# Patient Record
Sex: MALE | Race: BLACK OR AFRICAN AMERICAN
[De-identification: names, ages, dates, MRNs, and addresses within clinical notes are randomized per-mention and may not be internally consistent; named-entity substitution may affect disease eponyms.]

---

## 2017-02-20 ENCOUNTER — HOSPITAL ENCOUNTER (INPATIENT)
Dept: HOSPITAL 17 - NEPE | Age: 67
LOS: 6 days | Discharge: HOME | DRG: 871 | End: 2017-02-26
Attending: HOSPITALIST | Admitting: HOSPITALIST
Payer: COMMERCIAL

## 2017-02-20 VITALS
SYSTOLIC BLOOD PRESSURE: 209 MMHG | OXYGEN SATURATION: 95 % | HEART RATE: 92 BPM | RESPIRATION RATE: 20 BRPM | DIASTOLIC BLOOD PRESSURE: 106 MMHG | TEMPERATURE: 98.5 F

## 2017-02-20 DIAGNOSIS — K04.7: ICD-10-CM

## 2017-02-20 DIAGNOSIS — K76.0: ICD-10-CM

## 2017-02-20 DIAGNOSIS — R33.8: ICD-10-CM

## 2017-02-20 DIAGNOSIS — Z95.5: ICD-10-CM

## 2017-02-20 DIAGNOSIS — N40.1: ICD-10-CM

## 2017-02-20 DIAGNOSIS — I10: ICD-10-CM

## 2017-02-20 DIAGNOSIS — R10.84: ICD-10-CM

## 2017-02-20 DIAGNOSIS — K21.9: ICD-10-CM

## 2017-02-20 DIAGNOSIS — J18.9: ICD-10-CM

## 2017-02-20 DIAGNOSIS — N20.0: ICD-10-CM

## 2017-02-20 DIAGNOSIS — I25.10: ICD-10-CM

## 2017-02-20 DIAGNOSIS — N17.9: ICD-10-CM

## 2017-02-20 DIAGNOSIS — R74.8: ICD-10-CM

## 2017-02-20 DIAGNOSIS — Z79.4: ICD-10-CM

## 2017-02-20 DIAGNOSIS — E11.9: ICD-10-CM

## 2017-02-20 DIAGNOSIS — A41.59: Primary | ICD-10-CM

## 2017-02-20 DIAGNOSIS — R65.20: ICD-10-CM

## 2017-02-20 PROCEDURE — 74177 CT ABD & PELVIS W/CONTRAST: CPT

## 2017-02-20 PROCEDURE — 76705 ECHO EXAM OF ABDOMEN: CPT

## 2017-02-20 PROCEDURE — 83540 ASSAY OF IRON: CPT

## 2017-02-20 PROCEDURE — 83520 IMMUNOASSAY QUANT NOS NONAB: CPT

## 2017-02-20 PROCEDURE — 87205 SMEAR GRAM STAIN: CPT

## 2017-02-20 PROCEDURE — 96374 THER/PROPH/DIAG INJ IV PUSH: CPT

## 2017-02-20 PROCEDURE — 87804 INFLUENZA ASSAY W/OPTIC: CPT

## 2017-02-20 PROCEDURE — 83550 IRON BINDING TEST: CPT

## 2017-02-20 PROCEDURE — G0480 DRUG TEST DEF 1-7 CLASSES: HCPCS

## 2017-02-20 PROCEDURE — 82105 ALPHA-FETOPROTEIN SERUM: CPT

## 2017-02-20 PROCEDURE — 80053 COMPREHEN METABOLIC PANEL: CPT

## 2017-02-20 PROCEDURE — 83690 ASSAY OF LIPASE: CPT

## 2017-02-20 PROCEDURE — 96375 TX/PRO/DX INJ NEW DRUG ADDON: CPT

## 2017-02-20 PROCEDURE — 80076 HEPATIC FUNCTION PANEL: CPT

## 2017-02-20 PROCEDURE — 82948 REAGENT STRIP/BLOOD GLUCOSE: CPT

## 2017-02-20 PROCEDURE — 82103 ALPHA-1-ANTITRYPSIN TOTAL: CPT

## 2017-02-20 PROCEDURE — 87086 URINE CULTURE/COLONY COUNT: CPT

## 2017-02-20 PROCEDURE — 87040 BLOOD CULTURE FOR BACTERIA: CPT

## 2017-02-20 PROCEDURE — 80074 ACUTE HEPATITIS PANEL: CPT

## 2017-02-20 PROCEDURE — 85025 COMPLETE CBC W/AUTO DIFF WBC: CPT

## 2017-02-20 PROCEDURE — 86038 ANTINUCLEAR ANTIBODIES: CPT

## 2017-02-20 PROCEDURE — 87186 SC STD MICRODIL/AGAR DIL: CPT

## 2017-02-20 PROCEDURE — C9113 INJ PANTOPRAZOLE SODIUM, VIA: HCPCS

## 2017-02-20 PROCEDURE — A9569 TECHNETIUM TC-99M AUTO WBC: HCPCS

## 2017-02-20 PROCEDURE — 82248 BILIRUBIN DIRECT: CPT

## 2017-02-20 PROCEDURE — 80048 BASIC METABOLIC PNL TOTAL CA: CPT

## 2017-02-20 PROCEDURE — 83516 IMMUNOASSAY NONANTIBODY: CPT

## 2017-02-20 PROCEDURE — 81001 URINALYSIS AUTO W/SCOPE: CPT

## 2017-02-20 PROCEDURE — 78806: CPT

## 2017-02-20 PROCEDURE — 83880 ASSAY OF NATRIURETIC PEPTIDE: CPT

## 2017-02-20 PROCEDURE — 86256 FLUORESCENT ANTIBODY TITER: CPT

## 2017-02-20 PROCEDURE — 82728 ASSAY OF FERRITIN: CPT

## 2017-02-20 PROCEDURE — 80329 ANALGESICS NON-OPIOID 1 OR 2: CPT

## 2017-02-20 PROCEDURE — 82390 ASSAY OF CERULOPLASMIN: CPT

## 2017-02-20 PROCEDURE — 93005 ELECTROCARDIOGRAM TRACING: CPT

## 2017-02-20 PROCEDURE — 96361 HYDRATE IV INFUSION ADD-ON: CPT

## 2017-02-20 PROCEDURE — 71010: CPT

## 2017-02-20 PROCEDURE — 83605 ASSAY OF LACTIC ACID: CPT

## 2017-02-21 VITALS
SYSTOLIC BLOOD PRESSURE: 137 MMHG | RESPIRATION RATE: 20 BRPM | OXYGEN SATURATION: 95 % | TEMPERATURE: 99 F | HEART RATE: 105 BPM | DIASTOLIC BLOOD PRESSURE: 87 MMHG

## 2017-02-21 VITALS
SYSTOLIC BLOOD PRESSURE: 158 MMHG | DIASTOLIC BLOOD PRESSURE: 87 MMHG | RESPIRATION RATE: 18 BRPM | HEART RATE: 118 BPM | OXYGEN SATURATION: 93 % | TEMPERATURE: 101.6 F

## 2017-02-21 VITALS
TEMPERATURE: 97 F | HEART RATE: 70 BPM | SYSTOLIC BLOOD PRESSURE: 147 MMHG | RESPIRATION RATE: 17 BRPM | OXYGEN SATURATION: 98 % | DIASTOLIC BLOOD PRESSURE: 92 MMHG

## 2017-02-21 VITALS
OXYGEN SATURATION: 99 % | RESPIRATION RATE: 18 BRPM | DIASTOLIC BLOOD PRESSURE: 93 MMHG | SYSTOLIC BLOOD PRESSURE: 147 MMHG | HEART RATE: 64 BPM

## 2017-02-21 VITALS
OXYGEN SATURATION: 93 % | RESPIRATION RATE: 18 BRPM | TEMPERATURE: 99 F | HEART RATE: 118 BPM | SYSTOLIC BLOOD PRESSURE: 137 MMHG | DIASTOLIC BLOOD PRESSURE: 90 MMHG

## 2017-02-21 VITALS
SYSTOLIC BLOOD PRESSURE: 161 MMHG | RESPIRATION RATE: 25 BRPM | OXYGEN SATURATION: 91 % | TEMPERATURE: 99.9 F | HEART RATE: 117 BPM | DIASTOLIC BLOOD PRESSURE: 91 MMHG

## 2017-02-21 VITALS — OXYGEN SATURATION: 95 %

## 2017-02-21 VITALS
DIASTOLIC BLOOD PRESSURE: 87 MMHG | HEART RATE: 101 BPM | TEMPERATURE: 99 F | SYSTOLIC BLOOD PRESSURE: 141 MMHG | OXYGEN SATURATION: 95 % | RESPIRATION RATE: 19 BRPM

## 2017-02-21 VITALS
TEMPERATURE: 99 F | SYSTOLIC BLOOD PRESSURE: 132 MMHG | DIASTOLIC BLOOD PRESSURE: 83 MMHG | RESPIRATION RATE: 16 BRPM | HEART RATE: 81 BPM | OXYGEN SATURATION: 93 %

## 2017-02-21 VITALS
RESPIRATION RATE: 18 BRPM | SYSTOLIC BLOOD PRESSURE: 137 MMHG | DIASTOLIC BLOOD PRESSURE: 92 MMHG | OXYGEN SATURATION: 99 % | HEART RATE: 76 BPM

## 2017-02-21 VITALS
DIASTOLIC BLOOD PRESSURE: 89 MMHG | RESPIRATION RATE: 18 BRPM | SYSTOLIC BLOOD PRESSURE: 152 MMHG | HEART RATE: 66 BPM | OXYGEN SATURATION: 98 %

## 2017-02-21 VITALS — OXYGEN SATURATION: 93 %

## 2017-02-21 VITALS — HEART RATE: 69 BPM

## 2017-02-21 LAB
ALP SERPL-CCNC: 120 U/L (ref 45–117)
ALP SERPL-CCNC: 135 U/L (ref 45–117)
ALT SERPL-CCNC: 254 U/L (ref 12–78)
ALT SERPL-CCNC: 421 U/L (ref 12–78)
ANION GAP SERPL CALC-SCNC: 8 MEQ/L (ref 5–15)
ANION GAP SERPL CALC-SCNC: 8 MEQ/L (ref 5–15)
APAP SERPL-MCNC: (no result) MCG/ML (ref 10–30)
AST SERPL-CCNC: 342 U/L (ref 15–37)
AST SERPL-CCNC: 360 U/L (ref 15–37)
BASOPHILS # BLD AUTO: 0 TH/MM3 (ref 0–0.2)
BASOPHILS NFR BLD: 0.2 % (ref 0–2)
BILIRUB INDIRECT SERPL-MCNC: 1 MG/DL (ref 0–0.8)
BILIRUB SERPL-MCNC: 2.4 MG/DL (ref 0.2–1)
BILIRUB SERPL-MCNC: 3.1 MG/DL (ref 0.2–1)
BUN SERPL-MCNC: 14 MG/DL (ref 7–18)
BUN SERPL-MCNC: 15 MG/DL (ref 7–18)
CHLORIDE SERPL-SCNC: 104 MEQ/L (ref 98–107)
CHLORIDE SERPL-SCNC: 107 MEQ/L (ref 98–107)
COLOR UR: YELLOW
COMMENT (UR): (no result)
CULTURE IF INDICATED: (no result)
EOSINOPHIL # BLD: 0 TH/MM3 (ref 0–0.4)
EOSINOPHIL NFR BLD: 0.1 % (ref 0–4)
ERYTHROCYTE [DISTWIDTH] IN BLOOD BY AUTOMATED COUNT: 13.5 % (ref 11.6–17.2)
FERRITIN SERPL-MCNC: 477 NG/ML (ref 26–388)
GFR SERPLBLD BASED ON 1.73 SQ M-ARVRAT: 53 ML/MIN (ref 89–?)
GFR SERPLBLD BASED ON 1.73 SQ M-ARVRAT: 61 ML/MIN (ref 89–?)
GLUCOSE UR STRIP-MCNC: 1000 MG/DL
HCO3 BLD-SCNC: 27.1 MEQ/L (ref 21–32)
HCO3 BLD-SCNC: 29.1 MEQ/L (ref 21–32)
HCT VFR BLD CALC: 44.6 % (ref 39–51)
HEMO FLAGS: (no result)
HGB UR QL STRIP: (no result)
KETONES UR STRIP-MCNC: (no result) MG/DL
LYMPHOCYTES # BLD AUTO: 0.7 TH/MM3 (ref 1–4.8)
LYMPHOCYTES NFR BLD AUTO: 7.1 % (ref 9–44)
MCH RBC QN AUTO: 27.9 PG (ref 27–34)
MCHC RBC AUTO-ENTMCNC: 33 % (ref 32–36)
MCV RBC AUTO: 84.6 FL (ref 80–100)
MONOCYTES NFR BLD: 1.9 % (ref 0–8)
NEUTROPHILS # BLD AUTO: 9.3 TH/MM3 (ref 1.8–7.7)
NEUTROPHILS NFR BLD AUTO: 90.7 % (ref 16–70)
NITRITE UR QL STRIP: (no result)
PLATELET # BLD: 148 TH/MM3 (ref 150–450)
POTASSIUM SERPL-SCNC: 3.8 MEQ/L (ref 3.5–5.1)
POTASSIUM SERPL-SCNC: 4.3 MEQ/L (ref 3.5–5.1)
RBC # BLD AUTO: 5.27 MIL/MM3 (ref 4.5–5.9)
SODIUM SERPL-SCNC: 141 MEQ/L (ref 136–145)
SODIUM SERPL-SCNC: 142 MEQ/L (ref 136–145)
SP GR UR STRIP: 1.02 (ref 1–1.03)
SQUAMOUS #/AREA URNS HPF: <1 /HPF (ref 0–5)
TRANSFERRIN IRON PROFILE: 196 MG/DL (ref 200–360)
WBC # BLD AUTO: 10.3 TH/MM3 (ref 4–11)

## 2017-02-21 RX ADMIN — HUMAN INSULIN SCH UNITS: 100 INJECTION, SUSPENSION SUBCUTANEOUS at 09:27

## 2017-02-21 RX ADMIN — INSULIN ASPART SCH: 100 INJECTION, SOLUTION INTRAVENOUS; SUBCUTANEOUS at 16:00

## 2017-02-21 RX ADMIN — HEPARIN SODIUM SCH UNITS: 10000 INJECTION, SOLUTION INTRAVENOUS; SUBCUTANEOUS at 19:55

## 2017-02-21 RX ADMIN — ASPIRIN SCH MG: 81 TABLET ORAL at 09:27

## 2017-02-21 RX ADMIN — HEPARIN SODIUM SCH UNITS: 10000 INJECTION, SOLUTION INTRAVENOUS; SUBCUTANEOUS at 08:35

## 2017-02-21 RX ADMIN — SODIUM CHLORIDE, PRESERVATIVE FREE SCH ML: 5 INJECTION INTRAVENOUS at 19:54

## 2017-02-21 RX ADMIN — ATENOLOL SCH MG: 50 TABLET ORAL at 09:27

## 2017-02-21 RX ADMIN — INSULIN ASPART SCH: 100 INJECTION, SOLUTION INTRAVENOUS; SUBCUTANEOUS at 07:38

## 2017-02-21 RX ADMIN — SODIUM CHLORIDE, PRESERVATIVE FREE SCH ML: 5 INJECTION INTRAVENOUS at 07:38

## 2017-02-21 RX ADMIN — DICYCLOMINE HYDROCHLORIDE SCH MG: 20 TABLET ORAL at 09:27

## 2017-02-21 RX ADMIN — TAMSULOSIN HYDROCHLORIDE SCH MG: 0.4 CAPSULE ORAL at 19:54

## 2017-02-21 RX ADMIN — CIPROFLOXACIN SCH MLS/HR: 2 INJECTION, SOLUTION INTRAVENOUS at 07:37

## 2017-02-21 RX ADMIN — PANTOPRAZOLE SODIUM SCH MG: 40 INJECTION, POWDER, FOR SOLUTION INTRAVENOUS at 11:25

## 2017-02-21 RX ADMIN — PIOGLITAZONE SCH MG: 45 TABLET ORAL at 09:28

## 2017-02-21 RX ADMIN — FINASTERIDE SCH MG: 5 TABLET, FILM COATED ORAL at 19:54

## 2017-02-21 RX ADMIN — INSULIN ASPART SCH: 100 INJECTION, SOLUTION INTRAVENOUS; SUBCUTANEOUS at 13:12

## 2017-02-21 RX ADMIN — HUMAN INSULIN SCH UNITS: 100 INJECTION, SUSPENSION SUBCUTANEOUS at 20:08

## 2017-02-21 RX ADMIN — DICYCLOMINE HYDROCHLORIDE SCH MG: 20 TABLET ORAL at 16:52

## 2017-02-21 RX ADMIN — CIPROFLOXACIN SCH MLS/HR: 2 INJECTION, SOLUTION INTRAVENOUS at 19:54

## 2017-02-21 RX ADMIN — INSULIN ASPART SCH: 100 INJECTION, SOLUTION INTRAVENOUS; SUBCUTANEOUS at 20:01

## 2017-02-21 RX ADMIN — DICYCLOMINE HYDROCHLORIDE SCH MG: 20 TABLET ORAL at 13:12

## 2017-02-21 NOTE — HHI.HP
cc:   López Porras MD


__________________________________________________





[facility]


Service


[facility]


Primary Care Physician


López Porras MD


Admission Diagnosis


pneumonia/elevated liver enzymes


Diagnoses:  


(1) Pneumonia


(2) Sepsis


(3) Elevated liver enzymes


(4) HTN (hypertension)


(5) DM (diabetes mellitus)


Chief Complaint:  


abdominal pain


Travel History


International Travel<30 Days:  No


Contact w/Intl Traveler <30 Da:  No


Traveled to Known Affected Are:  No





Sepsis Criteria


SIRS Criteria (2 or more):  Temp > 100.9 or < 96.8, Heart rate over 90


Sepsis Criteria (SIRS+source):  Infect source susp/known


Criteria Outcome:  Meets sepsis criteria


History of Present Illness


The patient is a 66-year-old male who presented to the emergency department 

with a 2 day history of abdominal pain, body aches, and chills. These 

progressively got worse yesterday. He reports subjective fever, but no chills 

or night sweats. Denies cough or dyspnea. Abdominal pain is in the 

midepigastric region. No nausea or vomiting. No diarrhea or constipation.





Review of Systems


Constitutional:  COMPLAINS OF: Fever (subjective),  DENIES: Chills, Night Sweats


Eyes:  DENIES: Blurred vision, Vision loss


Ears, nose, mouth, throat:  DENIES: Hearing loss


Respiratory:  DENIES: Cough, Wheezing, Sputum production, Shortness of breath


Cardiovascular:  DENIES: Chest pain, Palpitations, Dyspnea on Exertion, Lower 

Extremity Edema


Gastrointestinal:  COMPLAINS OF: Abdominal pain,  DENIES: Constipation, Diarrhea

, Nausea, Vomiting


Genitourinary:  DENIES: Urinary frequency, Urinary incontinence, Urgency, 

Hematuria, Dysuria, Nocturia


Musculoskeletal:  DENIES: Joint pain, Muscle aches


Integumentary:  DENIES: Pruritus, Rash


Hematologic/lymphatic:  DENIES: Bruising


Neurologic:  DENIES: Headache





Past Family Social History


Past Medical History


Hypertension


History of TIA 3


GERD


BPH


CAD with history of MI


Diabetes mellitus


Past Surgical History


Cardiac catheterization with coronary artery stenting 


Cystoscopy


Dental surgery 1970s


Reported Medications


Novolin 70/30 Inj (Insulin Human Isoph/Insulin Regular) 1,000 Units/10 Ml Inj 5 

Units SQ BID


Flomax (Tamsulosin HCl) 0.4 Mg Cap 0.4 Mg PO HS


Pioglitazone (Pioglitazone HCl) 45 Mg Tab 45 Mg PO DAILY


Janumet (Sitagliptin-Metformin) 50-1,000 Mg Tab 1 Tab PO DAILY


Ibuprofen 600 Mg Tab 600 Mg PO DAILY PRN


Proscar (Finasteride) 5 Mg Tab 5 Mg PO HS


     Do not crush.


Bentyl (Dicyclomine HCl) 20 Mg Tab 20 Mg PO TID


Invokana (Canagliflozin) 100 Mg Tab 100 Mg PO DAILY


     Take before 1st meal of day.


Atenolol 50 Mg Tab 50 Mg PO DAILY


Aspirin 81 Mg Tabdr 81 Mg PO DAILY


Norvasc (Amlodipine Besylate) 10 Mg Tab 10 Mg PO DAILY


Allergies:  


Coded Allergies:  


     Shellfish (Verified  Allergy, Severe, Anaphylaxis, 2/21/17)


Family History


Hypertension


Social History


Denies alcohol, tobacco, or illicit drug use.





Physical Exam


Vital Signs





 Vital Signs








  Date Time  Temp Pulse Resp B/P Pulse Ox O2 Delivery O2 Flow Rate FiO2


 


2/21/17 07:32  101 19 141/87 95 Nasal Cannula 4 


 


2/21/17 07:20     95 Nasal Cannula 4.00 


 


2/21/17 06:41     95 Nasal Cannula 4.00 


 


2/21/17 05:48 99.0 105 20 137/87 95 Nasal Cannula 4 


 


2/21/17 03:51 99.0 118 18 137/90 93 Room Air  


 


2/21/17 01:11 101.6 118 18 158/87 93 Nasal Cannula 2 


 


2/21/17 01:02     93 Nasal Cannula 2 


 


2/21/17 00:19 99.9 117 25 161/91 91 Nasal Cannula 2 


 


2/21/17 00:19   22     


 


2/20/17 23:38 98.5 92 20 209/106 95   








Physical Exam


GENERAL: Well-nourished, well-developed male in no acute distress. 


HEENT: Normocephalic, atraumatic. Pupils equal, round and reactive. Extraocular 

movements intact. No scleral icterus. No injection or drainage. Oropharynx is 

clear. Mucous membranes are moist. 


CARDIOVASCULAR: Regular rate and rhythm without murmurs, gallops, or rubs. 


RESPIRATORY: Clear to auscultation. No wheezes, rales, or rhonchi. Breathing is 

non-labored. 


GASTROINTESTINAL: Abdomen soft, nondistended. There is diffuse tenderness to 

palpation without rebound or guarding.


EXTREMITIES: No lower extremity edema. No calf tenderness.


PSYCH: Alert and oriented x 3.


Laboratory





Laboratory Tests








Test 2/21/17 2/21/17 2/21/17





 00:40 00:44 04:54


 


Urine Color YELLOW   


 


Urine Turbidity HAZY   


 


Urine pH 7.0   


 


Urine Specific Gravity 1.020   


 


Urine Protein TRACE   


 


Urine Glucose (UA) 1000   


 


Urine Ketones NEG   


 


Urine Occult Blood NEG   


 


Urine Nitrite NEG   


 


Urine Bilirubin NEG   


 


Urine Urobilinogen 4.0   


 


Urine Leukocyte Esterase NEG   


 


Urine RBC 3   


 


Urine WBC 2   


 


Urine Squamous Epithelial <1   





Cells   


 


Microscopic Urinalysis Comment CULT NOT  





 INDICATED  


 


White Blood Count  10.3  


 


Red Blood Count  5.27  


 


Hemoglobin  14.7  


 


Hematocrit  44.6  


 


Mean Corpuscular Volume  84.6  


 


Mean Corpuscular Hemoglobin  27.9  


 


Mean Corpuscular Hemoglobin  33.0  





Concent   


 


Red Cell Distribution Width  13.5  


 


Platelet Count  148  


 


Mean Platelet Volume  10.1  


 


Neutrophils (%) (Auto)  90.7  


 


Lymphocytes (%) (Auto)  7.1  


 


Monocytes (%) (Auto)  1.9  


 


Eosinophils (%) (Auto)  0.1  


 


Basophils (%) (Auto)  0.2  


 


Neutrophils # (Auto)  9.3  


 


Lymphocytes # (Auto)  0.7  


 


Monocytes # (Auto)  0.2  


 


Eosinophils # (Auto)  0.0  


 


Basophils # (Auto)  0.0  


 


CBC Comment  DIFF FINAL  


 


Differential Comment    


 


Sodium Level  141  


 


Potassium Level  4.3  


 


Chloride Level  104  


 


Carbon Dioxide Level  29.1  


 


Anion Gap  8  


 


Blood Urea Nitrogen  14  


 


Creatinine  1.42  


 


Estimat Glomerular Filtration  61  





Rate   


 


Random Glucose  173  


 


Calcium Level  8.8  


 


Total Bilirubin  2.4  


 


Aspartate Amino Transf  342  





(AST/SGOT)   


 


Alanine Aminotransferase  254  





(ALT/SGPT)   


 


Alkaline Phosphatase  120  


 


Total Protein  8.1  


 


Albumin  4.0  


 


Lipase  129  


 


B-Type Natriuretic Peptide   19 














 Date/Time Procedure Status





Source Growth 


 


 2/21/17 01:30 Aerobic Blood Culture Received





Blood Peripheral Pending 





 2/21/17 01:30 Anaerobic Blood Culture Received





Blood Peripheral Pending 


 


 2/21/17 01:21 Influenza Types A,B Antigen (EFREN) - Final Complete





Nasal Washing NEGATIVE FOR FLU A AND B ANTIGEN.... 








Result Diagram:  


2/21/17 0044                                                                   

             2/21/17 0044





Imaging





Last Impressions








Gall Bladder Ultrasound 2/21/17 0228 Signed





Impressions: 





 Service Date/Time:  Tuesday, February 21, 2017 02:51 - CONCLUSION: Fatty 

liver.  





    López Peters MD 


 


Abdomen/Pelvis CT 2/21/17 0103 Signed





Impressions: 





 Service Date/Time:  Tuesday, February 21, 2017 01:51 - CONCLUSION:  1. 

Prostatic 





 enlargement with suspected very prominent compression on the bladder floor 





 creating a masslike area in the bladder floor. 2. Fatty infiltration liver. 3. 





 Nonobstructing left renal stone. 4. Bibasilar areas of consolidation or 





 atelectasis at the lung bases    López Peters MD 


 


Chest X-Ray 2/21/17 0018 Signed





Impressions: 





 Service Date/Time:  Tuesday, February 21, 2017 00:34 - CONCLUSION:  Expiratory 





 chest x-ray with suspected borderline cardiomegaly.     López Peters MD 











Assessment and Plan


Assessment and Plan


1. Abdominal pain: Uncertain etiology. Continue workup. Consult GI.


2. Elevated transaminases: Patient denies alcohol use. Ultrasound shows fatty 

liver. Monitor labs. Consult GI.


3. Sepsis secondary to pneumonia: Patient presented with fever, tachycardia. 

Suspected source is pneumonia. CT showed areas of consolidation versus 

atelectasis in the lung bases.


4. Hypertension: Continue atenolol, amlodipine.


5. Diabetes mellitus: Monitor Accu-Cheks and cover with sliding scale insulin. 

Continue 70/30 insulin. Hold Janumet secondary to renal insufficiency. Continue 

Invokana, pioglitazone.


6. BPH: Continue Flomax, Proscar.


7. DVT prophylaxis: Heparin.








Ravi Escobar MD Feb 21, 2017 08:17

## 2017-02-21 NOTE — RADRPT
EXAM DATE/TIME:  02/21/2017 01:51 

 

[facility] COMPARISON:     

CT ABDOMEN & PELVIS W CONTRAST, September 14, 2016, 16:03.

 

 

INDICATIONS :     

Abdominal pain and chills

                      

 

IV CONTRAST:     

95 cc Omnipaque 350 (iohexol) IV 

 

 

ORAL CONTRAST:      

No oral contrast ingested.

                      

 

RADIATION DOSE:     

10.02 CTDIvol (mGy) 

 

 

MEDICAL HISTORY :     

Hypertension. Diabetes mellitus type 2. 

 

SURGICAL HISTORY :      

None. 

 

ENCOUNTER:      

Initial

 

ACUITY:      

1 day

 

PAIN SCALE:      

6/10

 

LOCATION:       

Bilateral  abdomen

 

TECHNIQUE:     

Volumetric scanning of the abdomen and pelvis was performed.  Using automated exposure control and ad
justment of the mA and/or kV according to patient size, radiation dose was kept as low as reasonably 
achievable to obtain optimal diagnostic quality images. 

 

FINDINGS:     

 

LOWER LUNGS:     

There is increased parenchymal density at the posterior lower lobes bilaterally.

 

LIVER:     

There is diffuse fatty infiltration of the liver.

 

SPLEEN:     

Normal size without lesion.

 

PANCREAS:     

Within normal limits.

 

KIDNEYS:     

There is a 5 mm nonobstructing left renal stone. No hydronephrosis is seen on either side.

 

ADRENAL GLANDS:     

Within normal limits.

 

VASCULAR:     

There is no aortic aneurysm.

 

BOWEL/MESENTERY:     

There are colonic diverticula without definite inflammatory change.

 

ABDOMINAL WALL:     

Within normal limits.

 

RETROPERITONEUM:     

There is no lymphadenopathy. 

 

BLADDER:     

There is a mass at the bladder floor likely related to an enlarged prostate.

 

REPRODUCTIVE:     

The prostate is enlarged and impresses upon the bladder floor.

 

INGUINAL:     

There is no lymphadenopathy or hernia. 

 

MUSCULOSKELETAL:     

There is degenerative change in the spine.

 

CONCLUSION:     

1. Prostatic enlargement with suspected very prominent compression on the bladder floor creating a ma
sslike area in the bladder floor.

2. Fatty infiltration liver.

3. Nonobstructing left renal stone.

4. Bibasilar areas of consolidation or atelectasis at the lung bases

 

 

 López Peters MD on February 21, 2017 at 2:14           

Board Certified Radiologist.

 This report was verified electronically.

## 2017-02-21 NOTE — RADRPT
EXAM DATE/TIME:  02/21/2017 00:34 

 

[facility] COMPARISON:     

CHEST SINGLE AP, November 11, 2016, 10:16.

 

                     

INDICATIONS :     

Chest pain.

                     

 

MEDICAL HISTORY :            

Myocardial infarction. Hypertension. Hypercholesterolemia. TIA. GERD.   

 

SURGICAL HISTORY :        

Cardiac catheterization. Cystoscope.

 

ENCOUNTER:     

Initial                                        

 

ACUITY:     

1 day      

 

PAIN SCORE:     

5/10

 

LOCATION:     

Bilateral chest 

 

FINDINGS:     

AP vertebra. There some increased density at the base especially on the left which may be related to 
compressive change versus atelectasis. The heart size is borderline enlarged.

 

CONCLUSION:     

Expiratory chest x-ray with suspected borderline cardiomegaly.

 

 

 

 López Peters MD on February 21, 2017 at 0:54           

Board Certified Radiologist.

 This report was verified electronically.

## 2017-02-21 NOTE — EKG
Date Performed: 02/21/2017       Time Performed: 00:31:53

 

PTAGE:      66 years

 

EKG:      SINUS TACHYCARDIA CONSIDER INFERIOR MI- AGE UNDETERMINATE POOR R-WAVE PROGRESSION. ABNORMAL
 ECG

 

PREVIOUS TRACING       : 11/11/2016 10.26

 

DOCTOR:   Jose Carlos Mancuso  Interpretating Date/Time  02/21/2017 21:09:25

## 2017-02-21 NOTE — PD
HPI


Chief Complaint:  Abdominal Pain


Time Seen by Provider:  01:03


Travel History


International Travel<30 days:  No


Contact w/Intl Traveler<30days:  No


Traveled to known affect area:  No





History of Present Illness


HPI


66-year-old male with history of hypertension, diabetes, previous TIAs, BPH, 

presents to the ER today because he has had 2 days history of chills, body aches

, abdominal pains which she currently rates at a 10 out of 10.  He does not 

know any exacerbating or alleviating factors.  He denies any vomiting, diarrhea

, chest pains, shortness of breath, or any other symptoms.  He denies any 

previous symptoms similar to this.





Modifying Factors: None


Associated Signs & Symptoms: Chills, body aches, abdominal pain


Risk Factors: None





PFSH


Past Medical History


Hx Anticoagulant Therapy:  Yes (ASA)


Arthritis:  No


Asthma:  No


Autoimmune Disease:  No


Blood Disorders:  No


Anxiety:  No


Depression:  No


Heart Rhythm Problems:  No


Cancer:  No


Cardiac Catheterization:  Yes (04/2010)


Cardiovascular Problems:  Yes (HTN)


High Cholesterol:  Yes


Chemotherapy:  No


Chest Pain:  Yes


Congestive Heart Failure:  No


COPD:  No


Cerebrovascular Accident:  Yes (TIA X3)


Diabetes:  Yes


Patient Takes Glucophage:  No


Diminished Hearing:  No


Endocrine:  Yes


Gastrointestinal Disorders:  Yes (HEMMRHOIDS, ACID REFLEX)


GERD:  Yes


Glaucoma:  No


Genitourinary:  Yes (ENLARGED PROSTATE)


Headaches:  No


Hepatitis:  No


Hiatal Hernia:  No


Hypertension:  Yes


Immune Disorder:  No


Implanted Vascular Access Dvce:  No


Kidney Stones:  No


Musculoskeletal:  Yes (NECK PROBLEMS)


Neurologic:  Yes (HX OF 3 MINI STROKES -- 2008)


Psychiatric:  No


Reproductive:  No


Respiratory:  Yes


Migraines:  No


Myocardial Infarction:  Yes (04/2010)


Radiation Therapy:  No


Renal Failure:  No


Seizures:  No


Sleep Apnea:  No


Thyroid Disease:  No


Tetanus Vaccination:  > 5 Years


Influenza Vaccination:  Yes





Past Surgical History


Abdominal Surgery:  No


AICD:  No


Appendectomy:  No


Arteriovenous Shunt:  No


Cardiac Surgery:  Yes (HEART CATH STENT 2014)


Cholecystectomy:  No


Ear Surgery:  No


Endocrine Surgery:  No


Eye Surgery:  No


Genitourinary Surgery:  Yes (cystoscope)


Gynecologic Surgery:  No


Insulin Pump:  No


Joint Replacement:  No


Neurologic Surgery:  No


Oral Surgery:  Yes (DENTAL SURGERY IN 1970'S)


Pacemaker:  No


Thoracic Surgery:  No


Other Surgery:  Yes





Social History


Alcohol Use:  No


Tobacco Use:  No


Substance Use:  No





Allergies-Medications


(Allergen,Severity, Reaction):  


Coded Allergies:  


     Shellfish (Verified  Allergy, Severe, Anaphylaxis, 2/21/17)


Reported Meds & Prescriptions





Reported Meds & Active Scripts


Active


Reported


Novolin 70/30 Inj (Insulin Human Isoph/Insulin Regular) 1,000 Units/10 Ml Inj 5 

Units SQ BID


Flomax (Tamsulosin HCl) 0.4 Mg Cap 0.4 Mg PO HS


Pioglitazone (Pioglitazone HCl) 45 Mg Tab 45 Mg PO DAILY


Janumet (Sitagliptin-Metformin) 50-1,000 Mg Tab 1 Tab PO DAILY


Ibuprofen 600 Mg Tab 600 Mg PO DAILY PRN


Proscar (Finasteride) 5 Mg Tab 5 Mg PO HS


     Do not crush.


Bentyl (Dicyclomine HCl) 20 Mg Tab 20 Mg PO TID


Invokana (Canagliflozin) 100 Mg Tab 100 Mg PO DAILY


     Take before 1st meal of day.


Atenolol 50 Mg Tab 50 Mg PO DAILY


Aspirin 81 Mg Tabdr 81 Mg PO DAILY


Norvasc (Amlodipine Besylate) 10 Mg Tab 10 Mg PO DAILY








Review of Systems


Except as stated in HPI:  all other systems reviewed are Neg





Physical Exam


Narrative


GENERAL: Well-nourished, well-developed elderly -American male patient 

in moderate distress.  Awake and oriented 3.


SKIN: Warm and dry.


HEAD: Normocephalic.


EYES: No scleral icterus. No injection or drainage. 


NECK: Supple, trachea midline. 


CARDIOVASCULAR: Regular rate and rhythm without murmurs, gallops, or rubs. 


RESPIRATORY: Breath sounds equal bilaterally. No accessory muscle use.


GASTROINTESTINAL: Abdomen soft, diffuse abdominal tenderness without guarding 

or rebound, nondistended. 


MUSCULOSKELETAL: No cyanosis, or edema. 


BACK: Nontender without obvious deformity. No CVA tenderness.





Data


Data


Last Documented VS





Vital Signs








  Date Time  Temp Pulse Resp B/P Pulse Ox O2 Delivery O2 Flow Rate FiO2


 


2/21/17 03:51 99.0 118 18 137/90 93 Room Air  


 


2/21/17 01:11       2 








Orders





 Complete Blood Count With Diff (2/21/17 00:18)


Comprehensive Metabolic Panel (2/21/17 00:18)


Lipase (2/21/17 00:18)


Urinalysis - C+S If Indicated (2/21/17 00:18)


Iv Access Insert/Monitor (2/21/17 00:18)


Ecg Monitoring (2/21/17 00:18)


Oximetry (2/21/17 00:18)


Sodium Chloride 0.9% Flush (Ns Flush) (2/21/17 00:30)


Electrocardiogram (2/21/17 00:18)


Chest, Single Ap (2/21/17 00:18)


Blood Culture (2/21/17 01:03)


Influenzae A/B Antigen (2/21/17 01:03)


Ct Abd/Pel W Iv Contrast(Rout) (2/21/17 01:03)


Sodium Chlor 0.9% 1000 Ml Inj (Ns 1000 M (2/21/17 01:15)


Hydromorphone Pf Inj (Dilaudid Pf Inj) (2/21/17 01:15)


Ondansetron Inj (Zofran Inj) (2/21/17 01:15)


Iohexol 350 Inj (Omnipaque 350 Inj) (2/21/17 02:09)


Us Abdomen Gallbladder (2/21/17 02:28)


B-Type Natriuretic Peptide (2/21/17 04:33)


Ceftriaxone Inj (Rocephin Inj) (2/21/17 04:34)


Azithromycin Inj (Zithromax Inj) (2/21/17 04:34)





Labs





 Laboratory Tests








Test 2/21/17 2/21/17





 00:40 00:44


 


Urine Color YELLOW  


 


Urine Turbidity HAZY  


 


Urine pH 7.0  


 


Urine Specific Gravity 1.020  


 


Urine Protein TRACE mg/dL 


 


Urine Glucose (UA) 1000 mg/dL 


 


Urine Ketones NEG mg/dL 


 


Urine Occult Blood NEG  


 


Urine Nitrite NEG  


 


Urine Bilirubin NEG  


 


Urine Urobilinogen 4.0 MG/DL 


 


Urine Leukocyte Esterase NEG  


 


Urine RBC 3 /hpf 


 


Urine WBC 2 /hpf 


 


Urine Squamous Epithelial <1 /hpf 





Cells  


 


Microscopic Urinalysis Comment CULT NOT 





 INDICATED 


 


White Blood Count  10.3 TH/MM3


 


Red Blood Count  5.27 MIL/MM3


 


Hemoglobin  14.7 GM/DL


 


Hematocrit  44.6 %


 


Mean Corpuscular Volume  84.6 FL


 


Mean Corpuscular Hemoglobin  27.9 PG


 


Mean Corpuscular Hemoglobin  33.0 %





Concent  


 


Red Cell Distribution Width  13.5 %


 


Platelet Count  148 TH/MM3


 


Mean Platelet Volume  10.1 FL


 


Neutrophils (%) (Auto)  90.7 %


 


Lymphocytes (%) (Auto)  7.1 %


 


Monocytes (%) (Auto)  1.9 %


 


Eosinophils (%) (Auto)  0.1 %


 


Basophils (%) (Auto)  0.2 %


 


Neutrophils # (Auto)  9.3 TH/MM3


 


Lymphocytes # (Auto)  0.7 TH/MM3


 


Monocytes # (Auto)  0.2 TH/MM3


 


Eosinophils # (Auto)  0.0 TH/MM3


 


Basophils # (Auto)  0.0 TH/MM3


 


CBC Comment  DIFF FINAL 


 


Differential Comment   


 


Sodium Level  141 MEQ/L


 


Potassium Level  4.3 MEQ/L


 


Chloride Level  104 MEQ/L


 


Carbon Dioxide Level  29.1 MEQ/L


 


Anion Gap  8 MEQ/L


 


Blood Urea Nitrogen  14 MG/DL


 


Creatinine  1.42 MG/DL


 


Estimat Glomerular Filtration  61 ML/MIN





Rate  


 


Random Glucose  173 MG/DL


 


Calcium Level  8.8 MG/DL


 


Total Bilirubin  2.4 MG/DL


 


Aspartate Amino Transf  342 U/L





(AST/SGOT)  


 


Alanine Aminotransferase  254 U/L





(ALT/SGPT)  


 


Alkaline Phosphatase  120 U/L


 


Total Protein  8.1 GM/DL


 


Albumin  4.0 GM/DL


 


Lipase  129 U/L











MDM


Medical Decision Making


Medical Screen Exam Complete:  Yes


Emergency Medical Condition:  Yes


Medical Record Reviewed:  Yes


Interpretation(s)


EKG shows sinus tachycardia at a rate of 115 bpm with no signs of ST changes.





Laboratory Tests








Test 2/21/17 2/21/17





 00:40 00:44


 


Urine Turbidity HAZY  (CLEAR) 


 


Urine Glucose (UA) 1000 mg/dL 





 (NEG) 


 


Urine Urobilinogen 4.0 MG/DL 





 (LESS THAN 2.0) 


 


Platelet Count  148 TH/MM3





  (150-450)


 


Neutrophils (%) (Auto)  90.7 %





  (16.0-70.0)


 


Lymphocytes (%) (Auto)  7.1 %





  (9.0-44.0)


 


Neutrophils # (Auto)  9.3 TH/MM3





  (1.8-7.7)


 


Lymphocytes # (Auto)  0.7 TH/MM3





  (1.0-4.8)


 


Creatinine  1.42 MG/DL





  (0.60-1.30)


 


Estimat Glomerular Filtration  61 ML/MIN (>89)





Rate  


 


Random Glucose  173 MG/DL





  ()


 


Total Bilirubin  2.4 MG/DL





  (0.2-1.0)


 


Aspartate Amino Transf  342 U/L (15-37)





(AST/SGOT)  


 


Alanine Aminotransferase  254 U/L (12-78)





(ALT/SGPT)  


 


Alkaline Phosphatase  120 U/L





  ()








Last 24 hours Impressions








Gall Bladder Ultrasound 2/21/17 0228 Signed





Impressions: 





 Service Date/Time:  Tuesday, February 21, 2017 02:51 - CONCLUSION: Fatty 

liver.  





    López Peters MD 


 


Abdomen/Pelvis CT 2/21/17 0103 Signed





Impressions: 





 Service Date/Time:  Tuesday, February 21, 2017 01:51 - CONCLUSION:  1. 

Prostatic 





 enlargement with suspected very prominent compression on the bladder floor 





 creating a masslike area in the bladder floor. 2. Fatty infiltration liver. 3. 





 Nonobstructing left renal stone. 4. Bibasilar areas of consolidation or 





 atelectasis at the lung bases    López Peters MD 


 


Chest X-Ray 2/21/17 0018 Signed





Impressions: 





 Service Date/Time:  Tuesday, February 21, 2017 00:34 - CONCLUSION:  Expiratory 





 chest x-ray with suspected borderline cardiomegaly.     López Peters MD 








Differential Diagnosis


abdominal pain, chillsgastroenteritis versus influenza versus UTI versus acute 

intra-abdominal processes


Narrative Course


Patient was given IV fluids, pain medication nausea medication in the ER.  Lab 

work ordered shows significant liver enzyme elevations.  Influenza is negative.

  CAT scan ordered shows no signs of acute intra-abdominal processes.  CAT scan 

did show some basilar infiltrates questionable for underlying early pneumonia 

versus bronchitis.  Chest x-ray did not show any signs of acute lobar 

pneumonia.  It is uncertain why his liver enzymes are elevated.  However, 

ultrasound and CAT scan did not reveal any signs of acute liver issues or 

gallbladder issues.  He does have some signs of fatty liver.  Patient's 

saturations are low and considering pulmonary findings on CAT scan, there is 

concern for possible underlying pneumonia.  IV antibiotics were initiated in 

the ER.  Cultures have been drawn.  My plan would be to admit the patient for 

further treatment.  Case was discussed with Dr. Pratt for admission.





Sepsis Criteria


SIRS Criteria (2 or more):  Temp > 100.9 or < 96.8, Heart rate over 90


Sepsis Criteria (SIRS+source):  Infect source susp/known


Criteria Outcome:  Meets sepsis criteria





Diagnosis





 Primary Impression:  


 Elevated liver enzymes


 Additional Impression:  


 Pneumonia





Admitting Information


Admitting Physician Requests:  Admit


Condition:  Stable








SoonJulissa you MD Feb 21, 2017 03:43

## 2017-02-21 NOTE — PD.CONS
HPI


History of Present Illness


This is a 66 year old who reports that he came to the ER for evaluation after 

he woke up from a nap and was "shaking real bad."  He felt warm, but is unsure 

if he had a fever.  He denies any nausea/vomiting, but he did have abdominal 

pain.  This was in his epigastric area and radiated down his entire abdomen and 

to his back.  He describes this as a cramping pain.  It seemed to be aggravated 

if he would sit up and therefore he tried lying on his back and this did seem 

to help some, but the pain came back.  He denies any diarrhea, but reports mild 

constipation.  He denies melena or hematochezia.  He denies any suspicious food

, sick contacts.  He reports that he was on antibiotics back in December when 

he had a urinary catheter removed.  He reports that he had this secondary to 

prostate issues.  He denies any history of liver disease.  He denies any ETOH 

use.  He does not eat shellfish.  He was recently started on a medication for 

30 days, but he does not recall what this was.  He last had a colonoscopy about 

a year ago, but cannot recall who did this.  He denies any known history of 

liver disease in his family.





PFSH


Past Medical History


Hypertension


History of TIA's


GERD


BPH


CAD with history of MI


Diabetes mellitus


Past Surgical History


Cardiac catheterization with coronary artery stenting 


Cystoscopy


Dental surgery 1970s


Colonoscopy ~ 1 year ago


Coded Allergies:  


     Shellfish (Verified  Allergy, Severe, Anaphylaxis, 17)


Medications





 Allergies








Coded Allergies Type Severity Reaction Last Updated Verified


 


  Shellfish Allergy Severe Anaphylaxis 17 Yes








 Active Scripts








 Medications  Dose


 Route/Sig Days Date Category Dose


Instructions


 


 Novolin 70/30 Inj


  (Insulin Human


 Isoph/Insulin


 Regular) 1,000


 Units/10 Ml Inj  5 Units


 SQ BID   17 Reported 


 


 Flomax


  (Tamsulosin HCl)


 0.4 Mg Cap  0.4 Mg


 PO HS   16 Reported 


 


 Pioglitazone


  (Pioglitazone


 HCl) 45 Mg Tab  45 Mg


 PO DAILY   16 Reported 


 


 Janumet


  (Sitagliptin-Metformin)


 50-1,000 Mg Tab  1 Tab


 PO DAILY   16 Reported 


 


 Ibuprofen 600 Mg


 Tab  600 Mg


 PO DAILY PRN   16 Reported 


 


 Proscar


  (Finasteride) 5


 Mg Tab  5 Mg


 PO HS   16 Reported  Do not crush.


 


 Bentyl


  (Dicyclomine HCl)


 20 Mg Tab  20 Mg


 PO TID   16 Reported 


 


 Invokana


  (Canagliflozin)


 100 Mg Tab  100 Mg


 PO DAILY   16 Reported  Take before 1st meal of day.


 


 Atenolol 50 Mg Tab  50 Mg


 PO DAILY   16 Reported 


 


 Aspirin 81 Mg


 Tabdr  81 Mg


 PO DAILY   16 Reported 


 


 Norvasc


  (Amlodipine


 Besylate) 10 Mg


 Tab  10 Mg


 PO DAILY   16 Reported 








Family History


Denies any family hx of liver disease


His oldest brother is  from cancer, unclear etiology


Mother passed, unclear if she had some type cancer.


Social History


Denies alcohol, tobacco, or illicit drug use.





Review of Systems


Constitutional:  COMPLAINS OF: Diaphoretic episodes, Fatigue, Fever (? warm, 

unclear if he had fever), Chills


Respiratory:  COMPLAINS OF: Cough


Cardiovascular:  DENIES: Chest pain


Gastrointestinal:  COMPLAINS OF: Abdominal pain, Diarrhea,  DENIES: Black stools

, Bloody stools, Constipation, Nausea, Vomiting, Heartburn


Musculoskeletal:  COMPLAINS OF: Back pain


Integumentary:  DENIES: Rash


Neurologic:  DENIES: Headache


Psychiatric:  DENIES: Confusion





GI Exam


Vitals I&O





 Vital Signs








  Date Time  Temp Pulse Resp B/P Pulse Ox O2 Delivery O2 Flow Rate FiO2


 


17 07:32  101 19 141/87 95 Nasal Cannula 4 


 


17 07:20     95 Nasal Cannula 4.00 


 


17 06:41     95 Nasal Cannula 4.00 


 


17 05:48 99.0 105 20 137/87 95 Nasal Cannula 4 


 


17 03:51 99.0 118 18 137/90 93 Room Air  


 


17 01:11 101.6 118 18 158/87 93 Nasal Cannula 2 


 


17 01:02     93 Nasal Cannula 2 


 


17 00:19 99.9 117 25 161/91 91 Nasal Cannula 2 


 


17 00:19   22     


 


17 23:38 98.5 92 20 209/106 95   








Imaging





Last Impressions








Gall Bladder Ultrasound 17 0228 Signed





Impressions: 





 Service Date/Time:  2017 02:51 - CONCLUSION: Fatty 

liver.  





    López Peters MD 


 


Abdomen/Pelvis CT 17 0103 Signed





Impressions: 





 Service Date/Time:  2017 01:51 - CONCLUSION:  1. 

Prostatic 





 enlargement with suspected very prominent compression on the bladder floor 





 creating a masslike area in the bladder floor. 2. Fatty infiltration liver. 3. 





 Nonobstructing left renal stone. 4. Bibasilar areas of consolidation or 





 atelectasis at the lung bases    López Peters MD 


 


Chest X-Ray 17 0018 Signed





Impressions: 





 Service Date/Time:  2017 00:34 - CONCLUSION:  Expiratory 





 chest x-ray with suspected borderline cardiomegaly.     López Peters MD 








Laboratory











Test 17





 00:40 00:44 04:54 08:18


 


Urine Color YELLOW    


 


Urine Turbidity HAZY    


 


Urine pH 7.0    


 


Urine Specific Gravity 1.020    


 


Urine Protein TRACE mg/dL   


 


Urine Glucose (UA) 1000 mg/dL   


 


Urine Ketones NEG mg/dL   


 


Urine Occult Blood NEG    


 


Urine Nitrite NEG    


 


Urine Bilirubin NEG    


 


Urine Urobilinogen 4.0 MG/DL   


 


Urine Leukocyte Esterase NEG    


 


Urine RBC 3 /hpf   


 


Urine WBC 2 /hpf   


 


Urine Squamous Epithelial <1 /hpf   





Cells    


 


Microscopic Urinalysis Comment CULT NOT   





 INDICATED   


 


White Blood Count  10.3 TH/MM3  


 


Red Blood Count  5.27 MIL/MM3  


 


Hemoglobin  14.7 GM/DL  


 


Hematocrit  44.6 %  


 


Mean Corpuscular Volume  84.6 FL  


 


Mean Corpuscular Hemoglobin  27.9 PG  


 


Mean Corpuscular Hemoglobin  33.0 %  





Concent    


 


Red Cell Distribution Width  13.5 %  


 


Platelet Count  148 TH/MM3  


 


Mean Platelet Volume  10.1 FL  


 


Neutrophils (%) (Auto)  90.7 %  


 


Lymphocytes (%) (Auto)  7.1 %  


 


Monocytes (%) (Auto)  1.9 %  


 


Eosinophils (%) (Auto)  0.1 %  


 


Basophils (%) (Auto)  0.2 %  


 


Neutrophils # (Auto)  9.3 TH/MM3  


 


Lymphocytes # (Auto)  0.7 TH/MM3  


 


Monocytes # (Auto)  0.2 TH/MM3  


 


Eosinophils # (Auto)  0.0 TH/MM3  


 


Basophils # (Auto)  0.0 TH/MM3  


 


CBC Comment  DIFF FINAL   


 


Differential Comment     


 


Sodium Level  141 MEQ/L  142 MEQ/L


 


Potassium Level  4.3 MEQ/L  3.8 MEQ/L


 


Chloride Level  104 MEQ/L  107 MEQ/L


 


Carbon Dioxide Level  29.1 MEQ/L  27.1 MEQ/L


 


Anion Gap  8 MEQ/L  8 MEQ/L


 


Blood Urea Nitrogen  14 MG/DL  15 MG/DL


 


Creatinine  1.42 MG/DL  1.59 MG/DL


 


Estimat Glomerular Filtration  61 ML/MIN  53 ML/MIN





Rate    


 


Random Glucose  173 MG/DL  242 MG/DL


 


Calcium Level  8.8 MG/DL  8.0 MG/DL


 


Total Bilirubin  2.4 MG/DL  3.1 MG/DL


 


Aspartate Amino Transf  342 U/L  360 U/L





(AST/SGOT)    


 


Alanine Aminotransferase  254 U/L  421 U/L





(ALT/SGPT)    


 


Alkaline Phosphatase  120 U/L  135 U/L


 


Total Protein  8.1 GM/DL  7.1 GM/DL


 


Albumin  4.0 GM/DL  3.2 GM/DL


 


Lipase  129 U/L  


 


B-Type Natriuretic Peptide   19 PG/ML 


 


Lactic Acid Level    2.2 mmol/L














 Date/Time Procedure Status





Source Growth 


 


 17 01:30 Aerobic Blood Culture Received





Blood Peripheral Pending 





 17 01:30 Anaerobic Blood Culture Received





Blood Peripheral Pending 


 


 17 01:21 Influenza Types A,B Antigen (EFREN) - Final Complete





Nasal Washing NEGATIVE FOR FLU A AND B ANTIGEN.... 








Physical Examination


HEENT: Pupils round and reactive to light; normocephalic; atraumatic; no 

jaundice.  Throat is clear.


NECK: Neck is supple, no JVD, no lymphadenopathy.


CHEST:  CTA


CARDIAC:  RRR


ABDOMEN:  Soft, nondistended, mild diffuse tenderness; no hepatosplenomegaly; 

bowel sounds are present in all four quadrants.


EXTREMITIES: No clubbing, cyanosis, or edema.


SKIN:  Normal; no rash; no jaundice.


CNS:  No focal deficits; alert and oriented times three.





Assessment and Plan


Plan


ASSESSMENT:


- Abdominal pain.  Came in for chills, malaise, diffuse abdominal cramping from 

his epigastric area to his entire abdomen/back.  Denies suspicious


   food, sick contacts.  Unclear if he was recently on abx.  Abdomen/Pelvis CT (

17)--->  1. Prostatic enlargement with suspected very prominent


   compression on the bladder floor creating a masslike area in the bladder 

floor. 2. Fatty infiltration liver. 3. Nonobstructing left renal stone. 4. 

Bibasilar


   areas of consolidation or atelectasis at the lung bases.  Gall Bladder 

Ultrasound (17)---> Fatty liver.  WBC normal 10.3.  Lipase 129.  LFTs 


   elevated with T. Bili 3.1, , , Alk Phosph 135.  These were 

normal in 2016.  Of note, he does take dicyclomine at 


   home.  He last had a colonoscopy 1 year ago, cannot state who did this.  Now 

on Cipro.


- Elevated LFTs.  CT/US as above, fatty liver.  Lipase normal.  LFTs elevated 

with T. Bili 3.1, , , Alk Phosph 135.  These were normal in 


   2016.  Will ask lab to fractionate bilirubin and order liver 

workup. 


- GREGG with abnormal imaging of his bladder.  CT with Prostatic enlargement with 

suspected very prominent compression on the bladder floor creating a masslike


   area in the bladder floor.  He has a hx of BPH with urinary retention, 

requiring kirk catheter, pathology negative.  He reports that he had the 

catheter


   removed in December. Creat 1.42


- Questionable PNA.  S/P dose of azithromycin/ceftriaxone.  Now on Cipro.


- HTN, DM, Hx TIA, CAD.  Per primary





PLAN:


- ASHER


- Hepatitis profile


- AFP level


- Ferritin, Iron saturation


- Alpha 1 antitrypsin, Ceruloplasmin


- JENNIFER, AMA, ASMA


- Will ask lab to fractionate the bilirubin.  If this is more direct, then we 

will consider further imaging with MRCP


- PPI


- Dicyclomine


- Avoid hepatotoxic meds


- CBC, CMP in am


- Further recommendations to follow after patient is seen by Tosha Wayne 2017 09:43

## 2017-02-22 VITALS
OXYGEN SATURATION: 93 % | HEART RATE: 64 BPM | SYSTOLIC BLOOD PRESSURE: 134 MMHG | TEMPERATURE: 98.1 F | RESPIRATION RATE: 18 BRPM | DIASTOLIC BLOOD PRESSURE: 77 MMHG

## 2017-02-22 VITALS
SYSTOLIC BLOOD PRESSURE: 133 MMHG | RESPIRATION RATE: 17 BRPM | HEART RATE: 80 BPM | DIASTOLIC BLOOD PRESSURE: 80 MMHG | TEMPERATURE: 99.1 F | OXYGEN SATURATION: 94 %

## 2017-02-22 VITALS
RESPIRATION RATE: 16 BRPM | HEART RATE: 65 BPM | SYSTOLIC BLOOD PRESSURE: 120 MMHG | DIASTOLIC BLOOD PRESSURE: 70 MMHG | TEMPERATURE: 98.3 F | OXYGEN SATURATION: 94 %

## 2017-02-22 VITALS — OXYGEN SATURATION: 95 %

## 2017-02-22 VITALS
TEMPERATURE: 97.9 F | RESPIRATION RATE: 18 BRPM | HEART RATE: 76 BPM | DIASTOLIC BLOOD PRESSURE: 86 MMHG | SYSTOLIC BLOOD PRESSURE: 136 MMHG | OXYGEN SATURATION: 95 %

## 2017-02-22 VITALS
OXYGEN SATURATION: 97 % | RESPIRATION RATE: 18 BRPM | DIASTOLIC BLOOD PRESSURE: 71 MMHG | SYSTOLIC BLOOD PRESSURE: 124 MMHG | HEART RATE: 60 BPM | TEMPERATURE: 98.2 F

## 2017-02-22 VITALS
HEART RATE: 63 BPM | TEMPERATURE: 98.1 F | OXYGEN SATURATION: 95 % | RESPIRATION RATE: 16 BRPM | SYSTOLIC BLOOD PRESSURE: 117 MMHG | DIASTOLIC BLOOD PRESSURE: 65 MMHG

## 2017-02-22 VITALS — HEART RATE: 54 BPM

## 2017-02-22 LAB
ALP SERPL-CCNC: 130 U/L (ref 45–117)
ALT SERPL-CCNC: 337 U/L (ref 12–78)
ANION GAP SERPL CALC-SCNC: 9 MEQ/L (ref 5–15)
AST SERPL-CCNC: 167 U/L (ref 15–37)
BASOPHILS # BLD AUTO: 0 TH/MM3 (ref 0–0.2)
BASOPHILS NFR BLD: 0.2 % (ref 0–2)
BILIRUB SERPL-MCNC: 5 MG/DL (ref 0.2–1)
BUN SERPL-MCNC: 13 MG/DL (ref 7–18)
CHLORIDE SERPL-SCNC: 110 MEQ/L (ref 98–107)
COLOR UR: (no result)
EOSINOPHIL # BLD: 0.1 TH/MM3 (ref 0–0.4)
EOSINOPHIL NFR BLD: 0.9 % (ref 0–4)
ERYTHROCYTE [DISTWIDTH] IN BLOOD BY AUTOMATED COUNT: 13.1 % (ref 11.6–17.2)
GFR SERPLBLD BASED ON 1.73 SQ M-ARVRAT: 64 ML/MIN (ref 89–?)
GLUCOSE UR STRIP-MCNC: 1000 MG/DL
HCO3 BLD-SCNC: 24.6 MEQ/L (ref 21–32)
HCT VFR BLD CALC: 39.8 % (ref 39–51)
HEMO FLAGS: (no result)
HGB UR QL STRIP: (no result)
KETONES UR STRIP-MCNC: 10 MG/DL
LYMPHOCYTES # BLD AUTO: 1.8 TH/MM3 (ref 1–4.8)
LYMPHOCYTES NFR BLD AUTO: 14.1 % (ref 9–44)
MCH RBC QN AUTO: 27.3 PG (ref 27–34)
MCHC RBC AUTO-ENTMCNC: 32.1 % (ref 32–36)
MCV RBC AUTO: 85.1 FL (ref 80–100)
MONOCYTES NFR BLD: 12.2 % (ref 0–8)
MUCOUS THREADS #/AREA URNS LPF: (no result) /LPF
NEUTROPHILS # BLD AUTO: 9 TH/MM3 (ref 1.8–7.7)
NEUTROPHILS NFR BLD AUTO: 72.6 % (ref 16–70)
NITRITE UR QL STRIP: (no result)
PLATELET # BLD: 148 TH/MM3 (ref 150–450)
POTASSIUM SERPL-SCNC: 3.7 MEQ/L (ref 3.5–5.1)
RBC # BLD AUTO: 4.68 MIL/MM3 (ref 4.5–5.9)
SODIUM SERPL-SCNC: 144 MEQ/L (ref 136–145)
SP GR UR STRIP: 1.03 (ref 1–1.03)
SQUAMOUS #/AREA URNS HPF: 1 /HPF (ref 0–5)
TRANS CELLS #/AREA URNS HPF: <1 /HPF
WBC # BLD AUTO: 12.5 TH/MM3 (ref 4–11)

## 2017-02-22 RX ADMIN — SODIUM CHLORIDE, PRESERVATIVE FREE SCH ML: 5 INJECTION INTRAVENOUS at 20:37

## 2017-02-22 RX ADMIN — FINASTERIDE SCH MG: 5 TABLET, FILM COATED ORAL at 20:36

## 2017-02-22 RX ADMIN — TAZOBACTAM SODIUM AND PIPERACILLIN SODIUM SCH MLS/HR: 375; 3 INJECTION, SOLUTION INTRAVENOUS at 20:36

## 2017-02-22 RX ADMIN — HEPARIN SODIUM SCH UNITS: 10000 INJECTION, SOLUTION INTRAVENOUS; SUBCUTANEOUS at 20:36

## 2017-02-22 RX ADMIN — DICYCLOMINE HYDROCHLORIDE SCH MG: 20 TABLET ORAL at 12:10

## 2017-02-22 RX ADMIN — CIPROFLOXACIN SCH MLS/HR: 2 INJECTION, SOLUTION INTRAVENOUS at 09:10

## 2017-02-22 RX ADMIN — ATENOLOL SCH MG: 50 TABLET ORAL at 09:11

## 2017-02-22 RX ADMIN — PIOGLITAZONE SCH MG: 45 TABLET ORAL at 09:17

## 2017-02-22 RX ADMIN — INSULIN ASPART SCH: 100 INJECTION, SOLUTION INTRAVENOUS; SUBCUTANEOUS at 06:12

## 2017-02-22 RX ADMIN — SODIUM CHLORIDE, PRESERVATIVE FREE SCH ML: 5 INJECTION INTRAVENOUS at 09:12

## 2017-02-22 RX ADMIN — TAMSULOSIN HYDROCHLORIDE SCH MG: 0.4 CAPSULE ORAL at 20:36

## 2017-02-22 RX ADMIN — DICYCLOMINE HYDROCHLORIDE SCH MG: 20 TABLET ORAL at 16:37

## 2017-02-22 RX ADMIN — INSULIN ASPART SCH: 100 INJECTION, SOLUTION INTRAVENOUS; SUBCUTANEOUS at 20:50

## 2017-02-22 RX ADMIN — HUMAN INSULIN SCH UNITS: 100 INJECTION, SUSPENSION SUBCUTANEOUS at 20:36

## 2017-02-22 RX ADMIN — INSULIN ASPART SCH: 100 INJECTION, SOLUTION INTRAVENOUS; SUBCUTANEOUS at 10:32

## 2017-02-22 RX ADMIN — PANTOPRAZOLE SODIUM SCH MG: 40 INJECTION, POWDER, FOR SOLUTION INTRAVENOUS at 09:17

## 2017-02-22 RX ADMIN — CIPROFLOXACIN HYDROCHLORIDE SCH MG: 750 TABLET, FILM COATED ORAL at 20:36

## 2017-02-22 RX ADMIN — TAZOBACTAM SODIUM AND PIPERACILLIN SODIUM SCH MLS/HR: 375; 3 INJECTION, SOLUTION INTRAVENOUS at 15:52

## 2017-02-22 RX ADMIN — HUMAN INSULIN SCH UNITS: 100 INJECTION, SUSPENSION SUBCUTANEOUS at 09:12

## 2017-02-22 RX ADMIN — HEPARIN SODIUM SCH UNITS: 10000 INJECTION, SOLUTION INTRAVENOUS; SUBCUTANEOUS at 09:12

## 2017-02-22 RX ADMIN — ASPIRIN SCH MG: 81 TABLET ORAL at 09:11

## 2017-02-22 RX ADMIN — DICYCLOMINE HYDROCHLORIDE SCH MG: 20 TABLET ORAL at 09:11

## 2017-02-22 RX ADMIN — INSULIN ASPART SCH: 100 INJECTION, SOLUTION INTRAVENOUS; SUBCUTANEOUS at 16:36

## 2017-02-22 NOTE — PD.CONS
HPI


Service


Urology


Consult Requested By





Reason for Consult


Rule out prostatic abscess


Primary Care Physician


López Porras MD


Diagnosis:  


(1) Pneumonia


ICD Code:  J18.9


(2) Sepsis


ICD Code:  A41.9


(3) Elevated liver enzymes


ICD Code:  R74.8


(4) HTN (hypertension)


ICD Code:  I10


(5) DM (diabetes mellitus)


ICD Code:  E11.9


(6) Bacteremia


ICD Code:  R78.81


(7) Leukocytosis


ICD Code:  D72.829


History of Present Illness


A urology consult was placed to rule out the possibility of a prostatic abscess 

in this pleasant 66-year-old gentleman who was admitted with a 2 day history of 

generalized abdominal pain on the aches and chills.  Patient has a long-

standing history of BPH with associated PSA elevation and is status post a 

transrectal ultrasound with multiple needle biopsies of the prostate in 2014 

which were negative for malignancy.  Patient is being managed with finasteride 

5 mg by mouth daily.  A CT scan of the abdomen and pelvis was performed that 

demonstrated a markedly enlarged prostate with extension into the bladder base.

  The CT scan findings do not appear consistent with a prostatic abscess.  

There were no appreciable changes regarding the prostate in comparison to prior 

studies.  Patient states that he has been voiding without any difficulty.  He 

denies dysuria or hematuria.  He denies perineal discomfort.  He does admit to 

constipation over the past several days.  A bladder scan was performed with a 

post void residual of approximately 150 cc.  Last PSA reading was from 

September 2016 measuring 24.87.  Urinalysis on admission was essentially 

negative.





Review of Systems


Constitutional:  COMPLAINS OF: Fever, Chills


Gastrointestinal:  COMPLAINS OF: Abdominal pain, Nausea


Genitourinary:  DENIES: Urgency, Hematuria, Dysuria


Except as stated in HPI:  all other systems reviewed are Neg





Past Family Social History


Past Medical History


BPH


Diabetes mellitus


Coronary artery disease


Status post MI


GERD


Hypertension


History of transient ischemic attacks


Past Surgical History


Status post cardiac stents


Status post cystoscopy that confirmed obstructing BPH with median lobe 

enlargement


Status post transrectal ultrasound with multiple needle biopsies of prostate


Reported Medications


Refer to EMR


Allergies:  


Coded Allergies:  


     Shellfish (Verified  Allergy, Severe, Anaphylaxis, 2/21/17)


Active Ordered Medications


Refer to EMR


Family History


Hypertension


Social History


Denies tobacco, alcohol or illicit drug abuse





Physical Exam


Vital Signs





 Vital Signs








  Date Time  Temp Pulse Resp B/P Pulse Ox O2 Delivery O2 Flow Rate FiO2


 


2/22/17 16:00 98.2 60 18 124/71 97   


 


2/22/17 12:00 98.1 64 18 134/77 93   


 


2/22/17 09:14     95   


 


2/22/17 08:00 97.9 76 18 136/86 95   


 


2/22/17 04:00 98.1 63 16 117/65 95   


 


2/22/17 00:00 99.1 80 17 133/80 94   


 


2/21/17 20:00 99.0 81 16 132/83 93   


 


2/21/17 18:08  69      


 


2/21/17 17:23 97.0 70 17 147/92 98   








Physical Exam


GENERAL: This is a well-nourished, well-developed patient, in no apparent 

distress.


SKIN: No rashes, ecchymoses or lesions. Cool and dry.


HEAD: Atraumatic. Normocephalic. No temporal or scalp tenderness.


EYES: Pupils equal round and reactive. Extraocular motions intact. No scleral 

icterus. No injection or drainage. 


ENT: Nose without bleeding, purulent drainage or septal hematoma. Throat 

without erythema, tonsillar hypertrophy or exudate. Uvula midline. Airway 

patent.


NECK: Trachea midline. No JVD or lymphadenopathy. Supple, nontender, no 

meningeal signs.


GASTROINTESTINAL: Abdomen soft, non-tender, nondistended. No hepato-splenomegaly

, or palpable masses. No guarding.


GENITOURINARY: Normal phallus, testes descended, prostate approximate 40 g 

symmetrical and nontender to palpation.  No areas of fluctuance.  No palpable 

nodules.


MUSCULOSKELETAL: Extremities without clubbing, cyanosis, or edema. No joint 

tenderness, effusion, or edema noted. No calf tenderness. Negative Homans sign 

bilaterally.


NEUROLOGICAL: Awake and alert. Cranial nerves II through XII intact.  Motor and 

sensory grossly within normal limits. Five out of 5 muscle strength in all 

muscle groups.  Normal speech.


Laboratory





Laboratory Tests








Test 2/22/17 2/22/17





 06:19 14:35


 


White Blood Count 12.5  


 


Red Blood Count 4.68  


 


Hemoglobin 12.8  


 


Hematocrit 39.8  


 


Mean Corpuscular Volume 85.1  


 


Mean Corpuscular Hemoglobin 27.3  


 


Mean Corpuscular Hemoglobin 32.1  





Concent  


 


Red Cell Distribution Width 13.1  


 


Platelet Count 148  


 


Mean Platelet Volume 10.2  


 


Neutrophils (%) (Auto) 72.6  


 


Lymphocytes (%) (Auto) 14.1  


 


Monocytes (%) (Auto) 12.2  


 


Eosinophils (%) (Auto) 0.9  


 


Basophils (%) (Auto) 0.2  


 


Neutrophils # (Auto) 9.0  


 


Lymphocytes # (Auto) 1.8  


 


Monocytes # (Auto) 1.5  


 


Eosinophils # (Auto) 0.1  


 


Basophils # (Auto) 0.0  


 


CBC Comment DIFF FINAL  


 


Differential Comment   


 


Sodium Level 144  


 


Potassium Level 3.7  


 


Chloride Level 110  


 


Carbon Dioxide Level 24.6  


 


Anion Gap 9  


 


Blood Urea Nitrogen 13  


 


Creatinine 1.36  


 


Estimat Glomerular Filtration 64  





Rate  


 


Random Glucose 95  


 


Calcium Level 8.2  


 


Total Bilirubin 5.0  


 


Aspartate Amino Transf 167  





(AST/SGOT)  


 


Alanine Aminotransferase 337  





(ALT/SGPT)  


 


Alkaline Phosphatase 130  


 


Total Protein 6.8  


 


Albumin 3.2  


 


Urine Color  DARK-YELLOW 


 


Urine Turbidity  CLEAR 


 


Urine pH  5.5 


 


Urine Specific Gravity  1.035 


 


Urine Protein  TRACE 


 


Urine Glucose (UA)  1000 


 


Urine Ketones  10 


 


Urine Occult Blood  NEG 


 


Urine Nitrite  NEG 


 


Urine Bilirubin  SMALL 


 


Urine Urobilinogen  4.0 


 


Urine Leukocyte Esterase  SMALL 


 


Urine RBC  LESS THAN 1 


 


Urine WBC  8 


 


Urine WBC Clumps  RARE 


 


Urine Squamous Epithelial  1 





Cells  


 


Urine Transitional Epithelial  <1 





Cells  


 


Urine Mucus  FEW 














 Date/Time Procedure Status





Source Growth 


 


 2/22/17 16:04 Aerobic Blood Culture Received





Blood Peripheral Pending 





 2/22/17 16:04 Anaerobic Blood Culture Received





Blood Peripheral Pending 


 


 2/21/17 01:30 Aerobic Blood Culture - Preliminary Resulted





Blood Peripheral NO GROWTH IN 1 DAY 





 2/21/17 01:30 Anaerobic Blood Culture - Preliminary Resulted





 Klebsiella Pneumoniae 





 Enterobacter Species 


 


 2/21/17 01:21 Influenza Types A,B Antigen (EFREN) - Final Complete





Nasal Washing NEGATIVE FOR FLU A AND B ANTIGEN.... 








Result Diagram:  


2/22/17 0619                                                                   

             2/22/17 0619





Imaging


CT scan with findings as outlined above





Assessment and Plan


Assessment and Plan


Urologic impression: Abnormal findings of the prostate on CT scan consistent 

with BPH with enlargement of the median lobe.  Digital rectal exam not 

consistent with prostatitis or prostatic abscess.





Recommendations:


#1 patient advised to remain on the finasteride for his BPH


#2 patient to keep follow up appointment with me in April as scheduled


#3 no further  intervention indicated at the present time.








Jason Comer MD Feb 22, 2017 17:05

## 2017-02-22 NOTE — HHI.GIFU
Subjective


Remarks


Up in chair.  Mild nausea last night, none today.  No abdominal pain.  No 

diarrhea.





Objective


Vitals I&O





 Vital Signs








  Date Time  Temp Pulse Resp B/P Pulse Ox O2 Delivery O2 Flow Rate FiO2


 


2/22/17 12:00 98.1 64 18 134/77 93   


 


2/22/17 09:14     95   


 


2/22/17 08:00 97.9 76 18 136/86 95   


 


2/22/17 04:00 98.1 63 16 117/65 95   


 


2/22/17 00:00 99.1 80 17 133/80 94   


 


2/21/17 20:00 99.0 81 16 132/83 93   


 


2/21/17 18:08  69      


 


2/21/17 17:23 97.0 70 17 147/92 98   


 


2/21/17 15:55  66 18 152/89 98 Nasal Cannula 2 








 I/O








 2/21/17 2/21/17 2/21/17 2/22/17 2/22/17 2/22/17





 07:00 15:00 23:00 07:00 15:00 23:00


 


Intake Total   240 ml 240 ml 206 ml 


 


Output Total  800 ml 675 ml 400 ml  


 


Balance  -800 ml -435 ml -160 ml 206 ml 


 


      


 


Intake Oral   240 ml 240 ml  


 


IV Total     206 ml 


 


Output Urine Total  800 ml 675 ml 400 ml  


 


# Voids  2 2   


 


# Bowel Movements   0 0  








Laboratory





Laboratory Tests








Test 2/22/17





 06:19


 


White Blood Count 12.5 


 


Red Blood Count 4.68 


 


Hemoglobin 12.8 


 


Hematocrit 39.8 


 


Mean Corpuscular Volume 85.1 


 


Mean Corpuscular Hemoglobin 27.3 


 


Mean Corpuscular Hemoglobin 32.1 





Concent 


 


Red Cell Distribution Width 13.1 


 


Platelet Count 148 


 


Mean Platelet Volume 10.2 


 


Neutrophils (%) (Auto) 72.6 


 


Lymphocytes (%) (Auto) 14.1 


 


Monocytes (%) (Auto) 12.2 


 


Eosinophils (%) (Auto) 0.9 


 


Basophils (%) (Auto) 0.2 


 


Neutrophils # (Auto) 9.0 


 


Lymphocytes # (Auto) 1.8 


 


Monocytes # (Auto) 1.5 


 


Eosinophils # (Auto) 0.1 


 


Basophils # (Auto) 0.0 


 


CBC Comment DIFF FINAL 


 


Differential Comment  


 


Sodium Level 144 


 


Potassium Level 3.7 


 


Chloride Level 110 


 


Carbon Dioxide Level 24.6 


 


Anion Gap 9 


 


Blood Urea Nitrogen 13 


 


Creatinine 1.36 


 


Estimat Glomerular Filtration 64 





Rate 


 


Random Glucose 95 


 


Calcium Level 8.2 


 


Total Bilirubin 5.0 


 


Aspartate Amino Transf 167 





(AST/SGOT) 


 


Alanine Aminotransferase 337 





(ALT/SGPT) 


 


Alkaline Phosphatase 130 


 


Total Protein 6.8 


 


Albumin 3.2 














 Date/Time Procedure Status





Source Growth 


 


 2/21/17 01:30 Aerobic Blood Culture - Preliminary Resulted





Blood Peripheral NO GROWTH IN 1 DAY 





 2/21/17 01:30 Anaerobic Blood Culture - Preliminary Resulted





 Klebsiella Pneumoniae 





 Enterobacter Species 


 


 2/21/17 01:21 Influenza Types A,B Antigen (EFREN) - Final Complete





Nasal Washing NEGATIVE FOR FLU A AND B ANTIGEN.... 








Imaging





Last Impressions








Gall Bladder Ultrasound 2/21/17 0228 Signed





Impressions: 





 Service Date/Time:  Tuesday, February 21, 2017 02:51 - CONCLUSION: Fatty 

liver.  





    López Peters MD 


 


Abdomen/Pelvis CT 2/21/17 0103 Signed





Impressions: 





 Service Date/Time:  Tuesday, February 21, 2017 01:51 - CONCLUSION:  1. 

Prostatic 





 enlargement with suspected very prominent compression on the bladder floor 





 creating a masslike area in the bladder floor. 2. Fatty infiltration liver. 3. 





 Nonobstructing left renal stone. 4. Bibasilar areas of consolidation or 





 atelectasis at the lung bases    López Peters MD 


 


Chest X-Ray 2/21/17 0018 Signed





Impressions: 





 Service Date/Time:  Tuesday, February 21, 2017 00:34 - CONCLUSION:  Expiratory 





 chest x-ray with suspected borderline cardiomegaly.     López Peters MD 








Physical Exam


HEENT: Normocephalic; atraumatic; no jaundice.  Throat is clear.


NECK: Neck is supple, no JVD, no lymphadenopathy.


CHEST:  CTA


CARDIAC:  RRR


ABDOMEN:  Soft, nondistended, nontender; no hepatosplenomegaly; bowel sounds 

are present in all four quadrants.


EXTREMITIES: No clubbing, cyanosis, or edema.


SKIN:  Normal; no rash; no jaundice.


CNS:  No focal deficits; alert and oriented times three.





Assessment and Plan


Plan


ASSESSMENT:


- Abdominal pain.  Came in for chills, malaise, diffuse abdominal cramping from 

his epigastric area to his entire abdomen/back.  Denies suspicious


   food, sick contacts.  Unclear if he was recently on abx.  Abdomen/Pelvis CT (

2/21/17)--->  1. Prostatic enlargement with suspected very prominent


   compression on the bladder floor creating a masslike area in the bladder 

floor. 2. Fatty infiltration liver. 3. Nonobstructing left renal stone. 4. 

Bibasilar


   areas of consolidation or atelectasis at the lung bases.  Gall Bladder 

Ultrasound (2/21/17)---> Fatty liver.  WBC normal 10.3.  Lipase 129.  on 


   admission.  LFTs remain elevated with T. Bili 5.0,, ,, , Alk 

Phosph 130.  These were normal in November of 2016. He last had a colonoscopy 


   1 year ago, cannot state who did this.  Now on Cipro.  IMPROVED.  


- Elevated LFTs.  CT/US as above, fatty liver.  Lipase normal.  LFTs elevated 

with  T. Bili 5.0,, ,, , Alk Phosph 130.   These were normal in 


   November of 2016.  AFP 1.7, Alpha 1 Antitrypsin 119, Ceruloplasmin pending, 

Hepatitis negative, JENNIFER pending, AMA pending, ASMA pending,


   Celiac panel pending.  Ferritin 477, Iron Saturation 5.1%.  Bilirubin went up

, transaminases improving.


- GREGG with abnormal imaging of his bladder.  CT with Prostatic enlargement with 

suspected very prominent compression on the bladder floor creating a masslike


   area in the bladder floor.  He has a hx of BPH with urinary retention, 

requiring kirk catheter, pathology negative.  He reports that he had the 

catheter


   removed in December. Creat 1.36


- Questionable PNA.  S/P dose of azithromycin/ceftriaxone.  Cipro.


- HTN, DM, Hx TIA, CAD.  Per primary





PLAN:


- ASHER


- PPI


- Dicyclomine


- Hepatitis profile


- Await Alpha 1 antitrypsin, Ceruloplasmin


- Await JENNIFER, AMA, ASMA


- Monitor LFTs


- Avoid hepatotoxic meds


- Supportive care


- Further recommendations to follow based on the results of above


- Pt seen and examined by Dr. Palmer and this note is written on his behalf








Tosha Flynn Feb 22, 2017 13:55

## 2017-02-22 NOTE — PD.CONS
History of Present Illness


Service


Infectious disease


Consult Requested By


Dr Escobar


Reason for Consult


Evaluate patient with positive blood culture


Primary Care Physician


López Porras MD


Diagnoses:  


History of Present Illness


patient seen and examined.  Records reviewed.





Patient is a 66-year-old male presented to the hospital with an acute onset of 

abdominal pain.  He was also having significant chills and had some fevers.  

After he had shaking chills, he started having some pain in his mid back.  He 

denies any nausea or vomiting.  Has not had any significant diarrhea.  Patient 

states she is voiding okay.  His back pain is still there but it has not gotten 

worse.  Patient's history is significant for problem with urinary retention.  

He has had evaluation by urologist since last year, and actually had to 

hospitalization here for UTI.  In October he had enterococcus in the urine 

culture.  In November he was admitted and had Pseudomonas bacteremia.  On both 

admissions it was felt that it was related to his  tract.  Patient has known 

enlarged prostate from previous imaging studies, which was felt to be one of 

the problem causing his urinary retention.  Patient was being followed by the 

urologist, and apparently in December after follow-up, his Neumann catheter was 

removed.  Patient states that he has been voiding without any problem.  His 

next appointment is in April.





On this admission, he has been febrile up to 101 on his first hospital day.  

His white count was initially 10, and went up to 12,000.  His urinalysis was 

unremarkable.  One blood culture has Klebsiella and Enterobacter.  His LFTs are 

also abnormal.  CT of the abdomen and pelvis did not show any evidence of 

hydronephrosis.  He has the nonobstructing stone in the kidney.  He also has an 

enlarged prostate causing some compression in the roof of the urinary bladder.





Infectious disease consultation has been requested to evaluate the patient.





Review of Systems


Constitutional:  COMPLAINS OF: Fever, Chills


Eyes:  DENIES: Eye pain


Ears, nose, mouth, throat:  DENIES: Nasal discharge, Oral lesions, Throat pain, 

Ear Pain, Sinus Pain, Toothache


Respiratory:  DENIES: Cough, Shortness of breath


Cardiovascular:  DENIES: Chest pain, Palpitations, Syncope


Gastrointestinal:  COMPLAINS OF: Abdominal pain,  DENIES: Diarrhea, Nausea, 

Vomiting, Difficulty Swallowing


Genitourinary:  DENIES: Urgency, Dysuria, Penile Discharge, Testicular Swelling


Musculoskeletal:  DENIES: Joint pain, Muscle aches, Joint Swelling


Integumentary:  DENIES: Rash


Hematologic/lymphatic:  DENIES: Bruising


Immunologic/allergic:  DENIES: Urticaria


Neurologic:  DENIES: Headache, Localized weakness


Psychiatric:  DENIES: Anxiety, Hallucinations





Past Family Social History


Allergies:  


Coded Allergies:  


     Shellfish (Verified  Allergy, Severe, Anaphylaxis, 2/21/17)


Past Medical History


HTN


DM Type 2


Urinary Obstruction and retention


BPH


Past Surgical History


Cystoscopy


Active Ordered Medications


Norvasc


Aspirin


Tenormin


Proscar


Heparin


Insulin


Cipro


Bentyl


Protonix


Actos


Flomax


Social History


No Tobacco.


No ETOH.


No Illicit Drugs.





Physical Exam


Vital Signs





 Vital Signs








  Date Time  Temp Pulse Resp B/P Pulse Ox O2 Delivery O2 Flow Rate FiO2


 


2/22/17 12:00 98.1 64 18 134/77 93   


 


2/22/17 09:14     95   


 


2/22/17 08:00 97.9 76 18 136/86 95   


 


2/22/17 04:00 98.1 63 16 117/65 95   


 


2/22/17 00:00 99.1 80 17 133/80 94   


 


2/21/17 20:00 99.0 81 16 132/83 93   


 


2/21/17 18:08  69      


 


2/21/17 17:23 97.0 70 17 147/92 98   


 


2/21/17 15:55  66 18 152/89 98 Nasal Cannula 2 








Physical Exam


GENERAL: This is a well-nourished, well-developed male, awake and alert, not 

toxic appearing, not in respiratory distress.  


SKIN:   Warm and dry.  No rash or ecchymoses


HEAD: Atraumatic. Normocephalic. No temporal or scalp tenderness.


EYES:  Pink conjunctivae, no petechia or hemorrhage.  Pupils equal round and 

reactive. Extraocular motions intact.Has dirty sclera. No injection or 

drainage. 


ENT: Nose without bleeding, or purulent drainage.  Moist oral mucosa.  Throat 

without erythema,  or exudate. Uvula midline. Airway patent.


NECK: Trachea midline. No JVD or lymphadenopathy. Supple, nontender, no 

meningeal signs.


CARDIOVASCULAR: Regular rate and rhythm without murmurs, gallops, or rubs. 


RESPIRATORY: Clear to auscultation. Breath sounds equal bilaterally. No wheezes

, rales, or rhonchi.  


GASTROINTESTINAL: Abdomen soft, bowel sounds are present and normoactive, not 

distended, with mild tenderness in the epigastric region.  He had some mild 

pressure-like sensation on palpation of the suprapubic area.  No hepato-

splenomegaly, or palpable masses. No guarding. No rebound


MUSCULOSKELETAL: Extremities without clubbing, cyanosis, or edema. No joint 

tenderness, effusion, or edema noted. No calf tenderness. Negative Homans sign 

bilaterally.  Warm and well-perfused.


NEUROLOGICAL: Awake and alert. Cranial nerves II through XII intact.  Motor and 

sensory grossly within normal limits. Five out of 5 muscle strength in all 

muscle groups.  Normal speech.


PSYCH:  Normal affect, calm and cooperative


LINE:  PIV with no evidence of infection


Laboratory





Laboratory Tests








Test 2/22/17





 06:19


 


White Blood Count 12.5 


 


Red Blood Count 4.68 


 


Hemoglobin 12.8 


 


Hematocrit 39.8 


 


Mean Corpuscular Volume 85.1 


 


Mean Corpuscular Hemoglobin 27.3 


 


Mean Corpuscular Hemoglobin 32.1 





Concent 


 


Red Cell Distribution Width 13.1 


 


Platelet Count 148 


 


Mean Platelet Volume 10.2 


 


Neutrophils (%) (Auto) 72.6 


 


Lymphocytes (%) (Auto) 14.1 


 


Monocytes (%) (Auto) 12.2 


 


Eosinophils (%) (Auto) 0.9 


 


Basophils (%) (Auto) 0.2 


 


Neutrophils # (Auto) 9.0 


 


Lymphocytes # (Auto) 1.8 


 


Monocytes # (Auto) 1.5 


 


Eosinophils # (Auto) 0.1 


 


Basophils # (Auto) 0.0 


 


CBC Comment DIFF FINAL 


 


Differential Comment  


 


Sodium Level 144 


 


Potassium Level 3.7 


 


Chloride Level 110 


 


Carbon Dioxide Level 24.6 


 


Anion Gap 9 


 


Blood Urea Nitrogen 13 


 


Creatinine 1.36 


 


Estimat Glomerular Filtration 64 





Rate 


 


Random Glucose 95 


 


Calcium Level 8.2 


 


Total Bilirubin 5.0 


 


Aspartate Amino Transf 167 





(AST/SGOT) 


 


Alanine Aminotransferase 337 





(ALT/SGPT) 


 


Alkaline Phosphatase 130 


 


Total Protein 6.8 


 


Albumin 3.2 














 Date/Time Procedure Status





Source Growth 


 


 2/21/17 01:30 Aerobic Blood Culture - Preliminary Resulted





Blood Peripheral NO GROWTH IN 1 DAY 





 2/21/17 01:30 Anaerobic Blood Culture - Preliminary Resulted





 Klebsiella Pneumoniae 





 Enterobacter Species 


 


 2/21/17 01:21 Influenza Types A,B Antigen (EFREN) - Final Complete





Nasal Washing NEGATIVE FOR FLU A AND B ANTIGEN.... 








Result Diagram:  


2/22/17 0619 2/22/17 0619





Imaging


RADIOLOGY STUDIES/FILMS REVIEWED

















Gall Bladder Ultrasound 2/21/17 0228 Signed





Impressions: 





 Service Date/Time:  Tuesday, February 21, 2017 02:51 - CONCLUSION: Fatty 

liver.  





    López Peters MD 


 


Abdomen/Pelvis CT 2/21/17 0103 Signed





Impressions: 





 Service Date/Time:  Tuesday, February 21, 2017 01:51 - CONCLUSION:  1. 

Prostatic 





 enlargement with suspected very prominent compression on the bladder floor 





 creating a masslike area in the bladder floor. 2. Fatty infiltration liver. 3. 





 Nonobstructing left renal stone. 4. Bibasilar areas of consolidation or 





 atelectasis at the lung bases    López Peters MD 


 


Chest X-Ray 2/21/17 0018 Signed





Impressions: 





 Service Date/Time:  Tuesday, February 21, 2017 00:34 - CONCLUSION:  Expiratory 





 chest x-ray with suspected borderline cardiomegaly.     López Peters MD 











Assessment and Plan


Assessment and Plan


IMPRESSION


Klebsiella and Enterobacter bacteremia, source?


   -  ?GI/biliary, ?


   -  ?prostate


   -  clinically no evidence of PNA,  CT findings likely more of atelectasis 

than consolidation


Previous problem with urinary retention


Enlarged prostate


Renal insufficiency


Diabetes








RECOMMENDATION


Xray low thoracic upper lumbar spine to eval back pain


IV Zosyn


Bladder scan to eval retention


Will ask urology to evaluate the markedly enlarged prostate,  ?abscess


Also on Cipro


Follow C/S


Monitor progress


Repeat UA


I will make further recommendations once work-up is completed





I will follow along with you


Thank you for this consultation


Discussed Condition With


Explained plan to the patient








Kylah Duarte MD Feb 22, 2017 13:50

## 2017-02-22 NOTE — HHI.PR
Subjective


Remarks


Sepsis, abdominal pain, bacteremia. The patient states that he feels better 

today. Abdominal pain has improved. No chest pain. Does report some back 

discomfort, which was present prior to admission.





Objective


Vitals





 Vital Signs








  Date Time  Temp Pulse Resp B/P Pulse Ox O2 Delivery O2 Flow Rate FiO2


 


2/22/17 09:14     95   


 


2/22/17 08:00 97.9 76 18 136/86 95   


 


2/22/17 04:00 98.1 63 16 117/65 95   


 


2/22/17 00:00 99.1 80 17 133/80 94   


 


2/21/17 20:00 99.0 81 16 132/83 93   


 


2/21/17 18:08  69      


 


2/21/17 17:23 97.0 70 17 147/92 98   


 


2/21/17 15:55  66 18 152/89 98 Nasal Cannula 2 


 


2/21/17 13:11  64 18 147/93 99 Nasal Cannula 2 


 


2/21/17 11:10  76 18 137/92 99 Nasal Cannula 4 








 I/O








 2/21/17 2/21/17 2/21/17 2/22/17 2/22/17 2/22/17





 07:00 15:00 23:00 07:00 15:00 23:00


 


Intake Total   240 ml 240 ml  


 


Output Total  800 ml 675 ml 400 ml  


 


Balance  -800 ml -435 ml -160 ml  


 


      


 


Intake Oral   240 ml 240 ml  


 


Output Urine Total  800 ml 675 ml 400 ml  


 


# Voids  2 2   


 


# Bowel Movements   0 0  








Result Diagram:  


2/22/17 0619                                                                   

             2/22/17 0619





Imaging





Last Impressions








Gall Bladder Ultrasound 2/21/17 0228 Signed





Impressions: 





 Service Date/Time:  Tuesday, February 21, 2017 02:51 - CONCLUSION: Fatty 

liver.  





    López Peters MD 


 


Abdomen/Pelvis CT 2/21/17 0103 Signed





Impressions: 





 Service Date/Time:  Tuesday, February 21, 2017 01:51 - CONCLUSION:  1. 

Prostatic 





 enlargement with suspected very prominent compression on the bladder floor 





 creating a masslike area in the bladder floor. 2. Fatty infiltration liver. 3. 





 Nonobstructing left renal stone. 4. Bibasilar areas of consolidation or 





 atelectasis at the lung bases    López Peters MD 


 


Chest X-Ray 2/21/17 0018 Signed





Impressions: 





 Service Date/Time:  Tuesday, February 21, 2017 00:34 - CONCLUSION:  Expiratory 





 chest x-ray with suspected borderline cardiomegaly.     López Peters MD 








Objective Remarks


General: No acute distress.


Heart: Regular rate and rhythm. No murmur.


Lungs: Clear to auscultation bilaterally. No wheezes, rales, or rhonchi. 

Breathing is nonlabored.


Abdomen: Soft, nontender, nondistended.


Extremities: No lower extremity edema.


Psych: Alert and oriented.


Procedures


None


Urinary Catheter:  No


Vascular Central Line Catheter:  No





A/P


Problem List:  


(1) Pneumonia


ICD Code:  J18.9


Status:  Acute


(2) Sepsis


ICD Code:  A41.9


Status:  Acute


(3) Elevated liver enzymes


ICD Code:  R74.8


Status:  Acute


(4) HTN (hypertension)


ICD Code:  I10


Status:  Chronic


(5) DM (diabetes mellitus)


ICD Code:  E11.9


Status:  Chronic


(6) Bacteremia


ICD Code:  R78.81


Status:  Acute


(7) Leukocytosis


ICD Code:  D72.829


Status:  Acute


Assessment and Plan


1. Abdominal pain: Uncertain etiology. Continue workup. Appreciate GI 

recommendations.


2. Elevated transaminases: Patient denies alcohol use. Ultrasound shows fatty 

liver. Monitor labs. Hepatitis panel is negative. LFTs are trending down.


3. Sepsis secondary to pneumonia: Patient presented with fever, tachycardia. 

Suspected source is pneumonia. CT showed areas of consolidation versus 

atelectasis in the lung bases.


4. Hypertension: Continue atenolol, amlodipine.


5. Diabetes mellitus: Monitor Accu-Cheks and cover with sliding scale insulin. 

Continue 70/30 insulin. Hold Janumet secondary to renal insufficiency. Continue 

Invokana, pioglitazone.


6. BPH: Continue Flomax, Proscar.


7. Bacteremia: Blood culture growing gram-negative aydee. Continue antibiotics. 

Consult infectious disease.


8. DVT prophylaxis: Heparin.








Ravi Escobar MD Feb 22, 2017 10:46

## 2017-02-23 VITALS
RESPIRATION RATE: 16 BRPM | SYSTOLIC BLOOD PRESSURE: 143 MMHG | TEMPERATURE: 97.6 F | DIASTOLIC BLOOD PRESSURE: 86 MMHG | OXYGEN SATURATION: 99 % | HEART RATE: 52 BPM

## 2017-02-23 VITALS
OXYGEN SATURATION: 97 % | HEART RATE: 59 BPM | SYSTOLIC BLOOD PRESSURE: 135 MMHG | RESPIRATION RATE: 18 BRPM | DIASTOLIC BLOOD PRESSURE: 72 MMHG | TEMPERATURE: 98.1 F

## 2017-02-23 VITALS
DIASTOLIC BLOOD PRESSURE: 92 MMHG | HEART RATE: 54 BPM | TEMPERATURE: 97.6 F | OXYGEN SATURATION: 93 % | RESPIRATION RATE: 16 BRPM | SYSTOLIC BLOOD PRESSURE: 147 MMHG

## 2017-02-23 VITALS
OXYGEN SATURATION: 96 % | HEART RATE: 53 BPM | RESPIRATION RATE: 16 BRPM | TEMPERATURE: 97.4 F | SYSTOLIC BLOOD PRESSURE: 130 MMHG | DIASTOLIC BLOOD PRESSURE: 84 MMHG

## 2017-02-23 VITALS
RESPIRATION RATE: 16 BRPM | HEART RATE: 52 BPM | DIASTOLIC BLOOD PRESSURE: 85 MMHG | TEMPERATURE: 98 F | OXYGEN SATURATION: 95 % | SYSTOLIC BLOOD PRESSURE: 154 MMHG

## 2017-02-23 VITALS — HEART RATE: 53 BPM

## 2017-02-23 LAB
ALP SERPL-CCNC: 127 U/L (ref 45–117)
ALT SERPL-CCNC: 262 U/L (ref 12–78)
ANION GAP SERPL CALC-SCNC: 6 MEQ/L (ref 5–15)
AST SERPL-CCNC: 85 U/L (ref 15–37)
BASOPHILS # BLD AUTO: 0.1 TH/MM3 (ref 0–0.2)
BASOPHILS NFR BLD: 0.6 % (ref 0–2)
BILIRUB SERPL-MCNC: 1.3 MG/DL (ref 0.2–1)
BUN SERPL-MCNC: 15 MG/DL (ref 7–18)
CHLORIDE SERPL-SCNC: 109 MEQ/L (ref 98–107)
EOSINOPHIL # BLD: 0.2 TH/MM3 (ref 0–0.4)
EOSINOPHIL NFR BLD: 1.6 % (ref 0–4)
ERYTHROCYTE [DISTWIDTH] IN BLOOD BY AUTOMATED COUNT: 13.3 % (ref 11.6–17.2)
GFR SERPLBLD BASED ON 1.73 SQ M-ARVRAT: 70 ML/MIN (ref 89–?)
HCO3 BLD-SCNC: 25.5 MEQ/L (ref 21–32)
HCT VFR BLD CALC: 38.9 % (ref 39–51)
HEMO FLAGS: (no result)
LYMPHOCYTES # BLD AUTO: 2 TH/MM3 (ref 1–4.8)
LYMPHOCYTES NFR BLD AUTO: 20.2 % (ref 9–44)
MCH RBC QN AUTO: 27.5 PG (ref 27–34)
MCHC RBC AUTO-ENTMCNC: 32.4 % (ref 32–36)
MCV RBC AUTO: 84.9 FL (ref 80–100)
MITOCHONDRIA AB SER QL: 24.9 U
MONOCYTES NFR BLD: 13.2 % (ref 0–8)
NEUTROPHILS # BLD AUTO: 6.5 TH/MM3 (ref 1.8–7.7)
NEUTROPHILS NFR BLD AUTO: 64.4 % (ref 16–70)
PLATELET # BLD: 147 TH/MM3 (ref 150–450)
POTASSIUM SERPL-SCNC: 3.9 MEQ/L (ref 3.5–5.1)
RBC # BLD AUTO: 4.58 MIL/MM3 (ref 4.5–5.9)
SODIUM SERPL-SCNC: 140 MEQ/L (ref 136–145)
WBC # BLD AUTO: 10.1 TH/MM3 (ref 4–11)

## 2017-02-23 RX ADMIN — INSULIN ASPART SCH: 100 INJECTION, SOLUTION INTRAVENOUS; SUBCUTANEOUS at 15:39

## 2017-02-23 RX ADMIN — DICYCLOMINE HYDROCHLORIDE SCH MG: 20 TABLET ORAL at 15:35

## 2017-02-23 RX ADMIN — HUMAN INSULIN SCH UNITS: 100 INJECTION, SUSPENSION SUBCUTANEOUS at 07:56

## 2017-02-23 RX ADMIN — HEPARIN SODIUM SCH UNITS: 10000 INJECTION, SOLUTION INTRAVENOUS; SUBCUTANEOUS at 21:31

## 2017-02-23 RX ADMIN — TAZOBACTAM SODIUM AND PIPERACILLIN SODIUM SCH MLS/HR: 375; 3 INJECTION, SOLUTION INTRAVENOUS at 07:56

## 2017-02-23 RX ADMIN — TAZOBACTAM SODIUM AND PIPERACILLIN SODIUM SCH MLS/HR: 375; 3 INJECTION, SOLUTION INTRAVENOUS at 04:01

## 2017-02-23 RX ADMIN — TAZOBACTAM SODIUM AND PIPERACILLIN SODIUM SCH MLS/HR: 375; 3 INJECTION, SOLUTION INTRAVENOUS at 21:30

## 2017-02-23 RX ADMIN — SODIUM CHLORIDE, PRESERVATIVE FREE SCH ML: 5 INJECTION INTRAVENOUS at 07:55

## 2017-02-23 RX ADMIN — TAMSULOSIN HYDROCHLORIDE SCH MG: 0.4 CAPSULE ORAL at 21:30

## 2017-02-23 RX ADMIN — INSULIN ASPART SCH: 100 INJECTION, SOLUTION INTRAVENOUS; SUBCUTANEOUS at 21:00

## 2017-02-23 RX ADMIN — CIPROFLOXACIN HYDROCHLORIDE SCH MG: 750 TABLET, FILM COATED ORAL at 07:56

## 2017-02-23 RX ADMIN — INSULIN ASPART SCH: 100 INJECTION, SOLUTION INTRAVENOUS; SUBCUTANEOUS at 06:25

## 2017-02-23 RX ADMIN — DICYCLOMINE HYDROCHLORIDE SCH MG: 20 TABLET ORAL at 11:45

## 2017-02-23 RX ADMIN — PIOGLITAZONE SCH MG: 45 TABLET ORAL at 07:56

## 2017-02-23 RX ADMIN — FINASTERIDE SCH MG: 5 TABLET, FILM COATED ORAL at 21:30

## 2017-02-23 RX ADMIN — ATENOLOL SCH MG: 50 TABLET ORAL at 07:56

## 2017-02-23 RX ADMIN — DICYCLOMINE HYDROCHLORIDE SCH MG: 20 TABLET ORAL at 07:56

## 2017-02-23 RX ADMIN — PANTOPRAZOLE SODIUM SCH MG: 40 INJECTION, POWDER, FOR SOLUTION INTRAVENOUS at 07:55

## 2017-02-23 RX ADMIN — TAZOBACTAM SODIUM AND PIPERACILLIN SODIUM SCH MLS/HR: 375; 3 INJECTION, SOLUTION INTRAVENOUS at 15:35

## 2017-02-23 RX ADMIN — CIPROFLOXACIN HYDROCHLORIDE SCH MG: 750 TABLET, FILM COATED ORAL at 21:30

## 2017-02-23 RX ADMIN — SODIUM CHLORIDE, PRESERVATIVE FREE SCH ML: 5 INJECTION INTRAVENOUS at 21:31

## 2017-02-23 RX ADMIN — HEPARIN SODIUM SCH UNITS: 10000 INJECTION, SOLUTION INTRAVENOUS; SUBCUTANEOUS at 07:58

## 2017-02-23 RX ADMIN — ASPIRIN SCH MG: 81 TABLET ORAL at 07:56

## 2017-02-23 RX ADMIN — HUMAN INSULIN SCH UNITS: 100 INJECTION, SUSPENSION SUBCUTANEOUS at 21:30

## 2017-02-23 RX ADMIN — INSULIN ASPART SCH: 100 INJECTION, SOLUTION INTRAVENOUS; SUBCUTANEOUS at 11:00

## 2017-02-23 NOTE — HHI.PR
Subjective


Remarks


Patient sleepy, says she has decreased appetite and feels tired. No fever or 

chills overnight. No n/v/d/c. Has productive cough.





Objective


Vitals





 Vital Signs








  Date Time  Temp Pulse Resp B/P Pulse Ox O2 Delivery O2 Flow Rate FiO2


 


2/23/17 04:00 98.0 52 16 154/85 95   


 


2/23/17 00:00 97.4 53 16 130/84 96   


 


2/22/17 20:00 98.3 65 16 120/70 94   


 


2/22/17 17:40  54      


 


2/22/17 16:00 98.2 60 18 124/71 97   


 


2/22/17 12:00 98.1 64 18 134/77 93   


 


2/22/17 09:14     95   


 


2/22/17 08:00 97.9 76 18 136/86 95   








 I/O








 2/22/17 2/22/17 2/22/17 2/23/17 2/23/17 2/23/17





 07:00 15:00 23:00 07:00 15:00 23:00


 


Intake Total 240 ml 206 ml 840 ml 240 ml  


 


Output Total 400 ml  900 ml 300 ml  


 


Balance -160 ml 206 ml -60 ml -60 ml  


 


      


 


Intake Oral 240 ml  840 ml 240 ml  


 


IV Total  206 ml    


 


Output Urine Total 400 ml  900 ml 300 ml  


 


# Voids   1   


 


# Bowel Movements 0  0 0  








Result Diagram:  


2/23/17 0623 2/22/17 0619





Imaging





Last Impressions








Gall Bladder Ultrasound 2/21/17 0228 Signed





Impressions: 





 Service Date/Time:  Tuesday, February 21, 2017 02:51 - CONCLUSION: Fatty 

liver.  





    López Peters MD 


 


Abdomen/Pelvis CT 2/21/17 0103 Signed





Impressions: 





 Service Date/Time:  Tuesday, February 21, 2017 01:51 - CONCLUSION:  1. 

Prostatic 





 enlargement with suspected very prominent compression on the bladder floor 





 creating a masslike area in the bladder floor. 2. Fatty infiltration liver. 3. 





 Nonobstructing left renal stone. 4. Bibasilar areas of consolidation or 





 atelectasis at the lung bases    López Peters MD 


 


Chest X-Ray 2/21/17 0018 Signed





Impressions: 





 Service Date/Time:  Tuesday, February 21, 2017 00:34 - CONCLUSION:  Expiratory 





 chest x-ray with suspected borderline cardiomegaly.     López Peters MD 








Objective Remarks


GENERAL:  65 yo male, well nourished, well developed patient, in not acute 

distress. 


SKIN: Warm and dry.


HEAD: Atraumatic. Normocephalic. 


EYES: Pupils equal and round. No scleral icterus. No injection or drainage. 


ENT: No nasal bleeding or discharge.  Mucous membranes pink and moist.


NECK: Trachea midline. No JVD. 


CARDIOVASCULAR: Regular rate and rhythm.  


RESPIRATORY: No accessory muscle use. Clear to auscultation. Breath sounds 

equal bilaterally. 


GASTROINTESTINAL: Abdomen soft, non-tender, nondistended. Hepatic and splenic 

margins not palpable. 


MUSCULOSKELETAL: Extremities without clubbing, cyanosis, or edema. No obvious 

deformities. 


NEUROLOGICAL: Awake and alert. No obvious cranial nerve deficits.  Motor 

grossly within normal limits. Five out of 5 muscle strength in the arms and 

legs.  Normal speech.


PSYCHIATRIC: Appropriate mood and affect; insight and judgment normal.


Procedures


None





A/P


Problem List:  


(1) Pneumonia


ICD Code:  J18.9


Status:  Acute


(2) Sepsis


ICD Code:  A41.9


Status:  Acute


(3) Elevated liver enzymes


ICD Code:  R74.8


Status:  Acute


(4) HTN (hypertension)


ICD Code:  I10


Status:  Chronic


(5) DM (diabetes mellitus)


ICD Code:  E11.9


Status:  Chronic


(6) Bacteremia


ICD Code:  R78.81


Status:  Acute


(7) Leukocytosis


ICD Code:  D72.829


Status:  Acute


Assessment and Plan


Abdominal pain: Uncertain etiology. Continue workup. Appreciate GI 

recommendations.


Elevated transaminases: Patient denies alcohol use. Ultrasound shows fatty 

liver. Monitor labs. Hepatitis panel is negative. LFTs are trending down. Poss 

acute 2/2 sepsis. 


Sepsis secondary to pneumonia/also bacteremia: Patient with fever, tachycardia 

on admission. Suspected source is pneumonia. CT showed areas of consolidation 

versus atelectasis in the lung bases. 


Bacteremia: Blood culture growing GNR Klebsiella pneumoniae and Enterobacter 

species. Repeat Blood cultures 2/22/17 NTD. Monitor unril neg Blood cx.  

Continue antibiotics. Infectious disease specialist consulted and following. 


Hypertension: Fairly controlled Continue atenolol, amlodipine. Monitor VS and 

adjust meds if need. Vasotec IV prn if high BP


GREGG. Continue IVF. Monitor Kidney indices. Avoid nephrotoxics. UA neg. Pt also 

has BPH. 


Diabetes mellitus type 2, controlled A1c 5.3 9/16. Monitor Accu-Cheks and cover 

with sliding scale insulin. Continue 70/30 insulin. Hold Janumet secondary to 

renal insufficiency. Continue Invokana, pioglitazone.


BPH: Continue Flomax, Proscar. Follow up with Dr Comer as OP. Has an 

appointment in April. 








DVT prophylaxis: Heparin.


Discussed with patient, nurse








Shea Villarreal MD Feb 23, 2017 07:52

## 2017-02-23 NOTE — HHI.IDPN
Subjective


Subjective


Remarks


Notes reviewed


No fever


Urology notes appreciated


No new (+) BC


LFTs improving


NO new complaints


Repeat UA now with pyuria


Antibiotics


Zosyn


Cipro


Lines


PIV


Past Medical History


HTN


DM Type 2


Urinary Obstruction and retention


BPH


Past Surgical History


Cystoscopy


Allergies:  


Coded Allergies:  


     Shellfish (Verified  Allergy, Severe, Anaphylaxis, 2/21/17)





Objective


.





 Vital Signs








  Date Time  Temp Pulse Resp B/P Pulse Ox O2 Delivery O2 Flow Rate FiO2


 


2/23/17 08:53  53      


 


2/23/17 08:10 98.1 59 18 135/72 97   


 


2/23/17 04:00 98.0 52 16 154/85 95   


 


2/23/17 00:00 97.4 53 16 130/84 96   


 


2/22/17 20:00 98.3 65 16 120/70 94   


 


2/22/17 17:40  54      


 


2/22/17 16:00 98.2 60 18 124/71 97   














 2/22/17 2/22/17 2/23/17





 15:00 23:00 07:00


 


Intake Total 206 ml 840 ml 240 ml


 


Output Total  900 ml 300 ml


 


Balance 206 ml -60 ml -60 ml


 


   


 


Intake Oral  840 ml 240 ml


 


IV Total 206 ml  


 


Output Urine Total  900 ml 300 ml


 


# Voids  1 


 


# Bowel Movements  0 0








.





Laboratory Tests








Test 2/22/17 2/23/17





 06:19 06:23


 


White Blood Count 12.5 TH/MM3 10.1 TH/MM3


 


Red Blood Count 4.68 MIL/MM3 4.58 MIL/MM3


 


Hemoglobin 12.8 GM/DL 12.6 GM/DL


 


Hematocrit 39.8 % 38.9 %


 


Mean Corpuscular Volume 85.1 FL 84.9 FL


 


Mean Corpuscular Hemoglobin 27.3 PG 27.5 PG


 


Mean Corpuscular Hemoglobin 32.1 % 32.4 %





Concent  


 


Red Cell Distribution Width 13.1 % 13.3 %


 


Platelet Count 148 TH/MM3 147 TH/MM3


 


Mean Platelet Volume 10.2 FL 10.1 FL


 


Neutrophils (%) (Auto) 72.6 % 64.4 %


 


Lymphocytes (%) (Auto) 14.1 % 20.2 %


 


Monocytes (%) (Auto) 12.2 % 13.2 %


 


Eosinophils (%) (Auto) 0.9 % 1.6 %


 


Basophils (%) (Auto) 0.2 % 0.6 %


 


Neutrophils # (Auto) 9.0 TH/MM3 6.5 TH/MM3


 


Lymphocytes # (Auto) 1.8 TH/MM3 2.0 TH/MM3


 


Monocytes # (Auto) 1.5 TH/MM3 1.3 TH/MM3


 


Eosinophils # (Auto) 0.1 TH/MM3 0.2 TH/MM3


 


Basophils # (Auto) 0.0 TH/MM3 0.1 TH/MM3


 


CBC Comment DIFF FINAL  DIFF FINAL 


 


Differential Comment    








Laboratory Tests








Test 2/22/17 2/23/17 2/23/17





 06:19 06:23 10:43


 


Sodium Level 144 MEQ/L 140 MEQ/L 


 


Potassium Level 3.7 MEQ/L 3.9 MEQ/L 


 


Chloride Level 110 MEQ/L 109 MEQ/L 


 


Carbon Dioxide Level 24.6 MEQ/L 25.5 MEQ/L 


 


Anion Gap 9 MEQ/L 6 MEQ/L 


 


Blood Urea Nitrogen 13 MG/DL 15 MG/DL 


 


Creatinine 1.36 MG/DL 1.26 MG/DL 


 


Estimat Glomerular Filtration 64 ML/MIN 70 ML/MIN 





Rate   


 


Random Glucose 95 MG/ MG/DL 


 


Calcium Level 8.2 MG/DL 8.6 MG/DL 


 


Total Bilirubin 5.0 MG/DL 1.3 MG/DL 


 


Aspartate Amino Transf 167 U/L 85 U/L 





(AST/SGOT)   


 


Alanine Aminotransferase 337 U/L 262 U/L 





(ALT/SGPT)   


 


Alkaline Phosphatase 130 U/L 127 U/L 


 


Total Protein 6.8 GM/DL 6.9 GM/DL 


 


Albumin 3.2 GM/DL 3.1 GM/DL 


 


Lactic Acid Level   0.8 mmol/L








Microbiology








 Date/Time Procedure Status





Source Growth 


 


 2/21/17 01:20 Aerobic Blood Culture - Preliminary Resulted





Blood Peripheral NO GROWTH IN 2 DAYS 





 2/21/17 01:20 Anaerobic Blood Culture - Preliminary Resulted





Blood Peripheral NO GROWTH IN 2 DAYS 


 


 2/21/17 01:21 Influenza Types A,B Antigen (EFREN) - Final Complete





Nasal Washing NEGATIVE FOR FLU A AND B ANTIGEN.... 


 


 2/21/17 01:30 Aerobic Blood Culture - Preliminary Resulted





Blood Peripheral NO GROWTH IN 2 DAYS 





 2/21/17 01:30 Anaerobic Blood Culture - Preliminary Resulted





 Klebsiella Pneumoniae 





 Enterobacter Species 


 


 2/22/17 16:04 Aerobic Blood Culture - Preliminary Resulted





Blood Peripheral NO GROWTH IN 1 DAY 





 2/22/17 16:04 Anaerobic Blood Culture - Final Resulted





Blood Peripheral QNS - SEE AEROBE REPORT 


 


 2/22/17 16:04 Aerobic Blood Culture - Preliminary Resulted





Blood Peripheral NO GROWTH IN 1 DAY 





 2/22/17 16:04 Anaerobic Blood Culture - Final Resulted





Blood Peripheral QNS - SEE AEROBE REPORT 








Imaging














Gall Bladder Ultrasound 2/21/17 0228 Signed





Impressions: 





 Service Date/Time:  Tuesday, February 21, 2017 02:51 - CONCLUSION: Fatty 

liver.  





    López Peters MD 


 


Abdomen/Pelvis CT 2/21/17 0103 Signed





Impressions: 





 Service Date/Time:  Tuesday, February 21, 2017 01:51 - CONCLUSION:  1. 

Prostatic 





 enlargement with suspected very prominent compression on the bladder floor 





 creating a masslike area in the bladder floor. 2. Fatty infiltration liver. 3. 





 Nonobstructing left renal stone. 4. Bibasilar areas of consolidation or 





 atelectasis at the lung bases    López Peters MD 


 


Chest X-Ray 2/21/17 0018 Signed





Impressions: 





 Service Date/Time:  Tuesday, February 21, 2017 00:34 - CONCLUSION:  Expiratory 





 chest x-ray with suspected borderline cardiomegaly.     López Peters MD 








Physical Exam


GENERAL:   awake and alert,  not in respiratory distress.  


SKIN:   Warm and dry.  No rash or ecchymoses


HEENT:  Pink conjunctivae, no petechia or hemorrhage.  Has dirty sclera.  Moist 

oral mucosa.  


NECK:  Supple, nontender, no meningeal signs.


CARDIOVASCULAR: Regular rate and rhythm without murmurs, gallops, or rubs. 


RESPIRATORY: Clear to auscultation. Breath sounds equal bilaterally. No wheezes

, rales, or rhonchi.  


GASTROINTESTINAL: Abdomen soft, bowel sounds are present and normoactive, not 

distended, with mild tenderness in the epigastric region.  No guarding. No 

rebound


MUSCULOSKELETAL: Extremities without clubbing, cyanosis, or edema.  No calf 

tenderness.  Warm and well-perfused.


NEUROLOGICAL:   Non-focal


PSYCH:  Normal affect, calm and cooperative


LINE:  PIV with no evidence of infection





Assessment & Plan


Remarks


IMPRESSION


Klebsiella and Enterobacter bacteremia, source?


   -  ?GI/biliary, ?


   -  ?prostate


   -  clinically no evidence of PNA,  CT findings likely more of atelectasis 

than consolidation


   -  ?intermittent incomplete emptying


Had previous PSAE bacteremia


Previous problem with urinary retention


Enlarged prostate


Renal insufficiency


Diabetes








RECOMMENDATION


Continue IV Zosyn


Also on Cipro


Follow C/S


Monitor progress


Will get WBC scan to see if any other occult source of infection








Kylah Duarte MD Feb 23, 2017 13:21

## 2017-02-23 NOTE — HHI.GIFU
Subjective


Remarks


Pt OOB to bathroom.  Reports one loose BM last night.  Good appetite.





Objective


Vitals I&O





 Vital Signs








  Date Time  Temp Pulse Resp B/P Pulse Ox O2 Delivery O2 Flow Rate FiO2


 


2/23/17 08:53  53      


 


2/23/17 08:10 98.1 59 18 135/72 97   


 


2/23/17 04:00 98.0 52 16 154/85 95   


 


2/23/17 00:00 97.4 53 16 130/84 96   


 


2/22/17 20:00 98.3 65 16 120/70 94   


 


2/22/17 17:40  54      


 


2/22/17 16:00 98.2 60 18 124/71 97   








 I/O








 2/22/17 2/22/17 2/22/17 2/23/17 2/23/17 2/23/17





 07:00 15:00 23:00 07:00 15:00 23:00


 


Intake Total 240 ml 206 ml 840 ml 240 ml  


 


Output Total 400 ml  900 ml 300 ml  


 


Balance -160 ml 206 ml -60 ml -60 ml  


 


      


 


Intake Oral 240 ml  840 ml 240 ml  


 


IV Total  206 ml    


 


Output Urine Total 400 ml  900 ml 300 ml  


 


# Voids   1   


 


# Bowel Movements 0  0 0  








Laboratory





Laboratory Tests








Test 2/22/17 2/23/17 2/23/17





 14:35 06:23 10:43


 


Urine Color DARK-YELLOW   


 


Urine Turbidity CLEAR   


 


Urine pH 5.5   


 


Urine Specific Gravity 1.035   


 


Urine Protein TRACE   


 


Urine Glucose (UA) 1000   


 


Urine Ketones 10   


 


Urine Occult Blood NEG   


 


Urine Nitrite NEG   


 


Urine Bilirubin SMALL   


 


Urine Urobilinogen 4.0   


 


Urine Leukocyte Esterase SMALL   


 


Urine RBC LESS THAN 1   


 


Urine WBC 8   


 


Urine WBC Clumps RARE   


 


Urine Squamous Epithelial 1   





Cells   


 


Urine Transitional Epithelial <1   





Cells   


 


Urine Mucus FEW   


 


White Blood Count  10.1  


 


Red Blood Count  4.58  


 


Hemoglobin  12.6  


 


Hematocrit  38.9  


 


Mean Corpuscular Volume  84.9  


 


Mean Corpuscular Hemoglobin  27.5  


 


Mean Corpuscular Hemoglobin  32.4  





Concent   


 


Red Cell Distribution Width  13.3  


 


Platelet Count  147  


 


Mean Platelet Volume  10.1  


 


Neutrophils (%) (Auto)  64.4  


 


Lymphocytes (%) (Auto)  20.2  


 


Monocytes (%) (Auto)  13.2  


 


Eosinophils (%) (Auto)  1.6  


 


Basophils (%) (Auto)  0.6  


 


Neutrophils # (Auto)  6.5  


 


Lymphocytes # (Auto)  2.0  


 


Monocytes # (Auto)  1.3  


 


Eosinophils # (Auto)  0.2  


 


Basophils # (Auto)  0.1  


 


CBC Comment  DIFF FINAL  


 


Differential Comment    


 


Sodium Level  140  


 


Potassium Level  3.9  


 


Chloride Level  109  


 


Carbon Dioxide Level  25.5  


 


Anion Gap  6  


 


Blood Urea Nitrogen  15  


 


Creatinine  1.26  


 


Estimat Glomerular Filtration  70  





Rate   


 


Random Glucose  129  


 


Calcium Level  8.6  


 


Total Bilirubin  1.3  


 


Aspartate Amino Transf  85  





(AST/SGOT)   


 


Alanine Aminotransferase  262  





(ALT/SGPT)   


 


Alkaline Phosphatase  127  


 


Total Protein  6.9  


 


Albumin  3.1  


 


Lactic Acid Level   0.8 














 Date/Time Procedure Status





Source Growth 


 


 2/22/17 16:04 Aerobic Blood Culture - Preliminary Resulted





Blood Peripheral NO GROWTH IN 1 DAY 





 2/22/17 16:04 Anaerobic Blood Culture - Final Resulted





Blood Peripheral QNS - SEE AEROBE REPORT 


 


 2/21/17 01:21 Influenza Types A,B Antigen (EFREN) - Final Complete





Nasal Washing NEGATIVE FOR FLU A AND B ANTIGEN.... 








Imaging





Last Impressions








Gall Bladder Ultrasound 2/21/17 0228 Signed





Impressions: 





 Service Date/Time:  Tuesday, February 21, 2017 02:51 - CONCLUSION: Fatty 

liver.  





    López Peters MD 


 


Abdomen/Pelvis CT 2/21/17 0103 Signed





Impressions: 





 Service Date/Time:  Tuesday, February 21, 2017 01:51 - CONCLUSION:  1. 

Prostatic 





 enlargement with suspected very prominent compression on the bladder floor 





 creating a masslike area in the bladder floor. 2. Fatty infiltration liver. 3. 





 Nonobstructing left renal stone. 4. Bibasilar areas of consolidation or 





 atelectasis at the lung bases    López Peters MD 


 


Chest X-Ray 2/21/17 0018 Signed





Impressions: 





 Service Date/Time:  Tuesday, February 21, 2017 00:34 - CONCLUSION:  Expiratory 





 chest x-ray with suspected borderline cardiomegaly.     López Peters MD 








Physical Exam


HEENT: Normocephalic; atraumatic; no jaundice.  


CHEST:  CTA


CARDIAC:  RRR


ABDOMEN:  Soft, nondistended, nontender; no hepatosplenomegaly; bowel sounds 

are present in all four quadrants.


EXTREMITIES: No clubbing, cyanosis, or edema.


SKIN:  Normal; no rash; no jaundice.


CNS:  No focal deficits; alert and oriented times three.





Assessment and Plan


Plan


ASSESSMENT:


- Abdominal pain improving.  Came in for chills, malaise, diffuse abdominal 

cramping from his epigastric area to his entire abdomen/back.  Denies suspicious


   food, sick contacts.  Unclear if he was recently on abx.  Abdomen/Pelvis CT (

2/21/17)--->  1. Prostatic enlargement with suspected very prominent


   compression on the bladder floor creating a masslike area in the bladder 

floor. 2. Fatty infiltration liver. 3. Nonobstructing left renal stone. 4. 

Bibasilar


   areas of consolidation or atelectasis at the lung bases.  Gall Bladder 

Ultrasound (2/21/17)---> Fatty liver.  WBC normal 10.1.  Lipase 129.  on 


   admission.  LFTs trending down with T. Bili 1.3,, AST 85,, , Alk 

Phosph 127.  These were normal in November of 2016. He last had a colonoscopy 


   1 year ago, cannot state who did this.  Now on Cipro.  IMPROVED.  


- Elevated LFTs.  CT/US as above, fatty liver.  Lipase normal.  LFTs trending 

down with T. Bili 1.3,, AST 85,, , Alk Phosph 127.  These were normal in 


   November of 2016.  AFP 1.7, Alpha 1 Antitrypsin 119, Ceruloplasmin pending, 

Hepatitis negative, JENNIFER neg, AMA nonspecifically elevated 24.9, ASMA neg, alpha 


   1 antitrypsin normal Celiac panel neg.  Ferritin 477, Iron Saturation 5.1%. 

  


- GREGG with abnormal imaging of his bladder.  CT with Prostatic enlargement with 

suspected very prominent compression on the bladder floor creating a masslike


   area in the bladder floor.  He has a hx of BPH with urinary retention, 

requiring kirk catheter, pathology negative.  He reports that he had the 

catheter


   removed in December. Creat 1.26


- Questionable PNA.  S/P dose of azithromycin/ceftriaxone.  Cipro.


- HTN, DM, Hx TIA, CAD.  Per primary





PLAN:


- ASHER


- PPI


- Dicyclomine


- Await Ceruloplasmin


- Monitor LFTs


- Avoid hepatotoxic meds


- Supportive care


- If LFTs remain persistently elevated or worsen, consider liver biopsy


- Further recommendations to follow based on the results of above


- Pt seen and examined by Dr. Palmer and this note is written on his behalf








Tosha Flynn Feb 23, 2017 12:14

## 2017-02-24 VITALS
SYSTOLIC BLOOD PRESSURE: 136 MMHG | TEMPERATURE: 97.3 F | DIASTOLIC BLOOD PRESSURE: 80 MMHG | HEART RATE: 52 BPM | OXYGEN SATURATION: 96 % | RESPIRATION RATE: 16 BRPM

## 2017-02-24 VITALS
TEMPERATURE: 97.4 F | RESPIRATION RATE: 16 BRPM | OXYGEN SATURATION: 95 % | SYSTOLIC BLOOD PRESSURE: 130 MMHG | HEART RATE: 56 BPM | DIASTOLIC BLOOD PRESSURE: 81 MMHG

## 2017-02-24 VITALS
TEMPERATURE: 97.2 F | RESPIRATION RATE: 16 BRPM | DIASTOLIC BLOOD PRESSURE: 86 MMHG | SYSTOLIC BLOOD PRESSURE: 129 MMHG | HEART RATE: 60 BPM | OXYGEN SATURATION: 97 %

## 2017-02-24 VITALS
RESPIRATION RATE: 16 BRPM | OXYGEN SATURATION: 96 % | SYSTOLIC BLOOD PRESSURE: 128 MMHG | HEART RATE: 54 BPM | DIASTOLIC BLOOD PRESSURE: 74 MMHG | TEMPERATURE: 97.5 F

## 2017-02-24 VITALS
TEMPERATURE: 98.4 F | HEART RATE: 52 BPM | DIASTOLIC BLOOD PRESSURE: 80 MMHG | RESPIRATION RATE: 16 BRPM | SYSTOLIC BLOOD PRESSURE: 131 MMHG | OXYGEN SATURATION: 96 %

## 2017-02-24 VITALS — HEART RATE: 58 BPM

## 2017-02-24 VITALS — HEART RATE: 54 BPM

## 2017-02-24 VITALS — OXYGEN SATURATION: 96 %

## 2017-02-24 VITALS
DIASTOLIC BLOOD PRESSURE: 87 MMHG | HEART RATE: 56 BPM | OXYGEN SATURATION: 98 % | SYSTOLIC BLOOD PRESSURE: 137 MMHG | TEMPERATURE: 97.1 F | RESPIRATION RATE: 16 BRPM

## 2017-02-24 RX ADMIN — TAZOBACTAM SODIUM AND PIPERACILLIN SODIUM SCH MLS/HR: 375; 3 INJECTION, SOLUTION INTRAVENOUS at 15:59

## 2017-02-24 RX ADMIN — HEPARIN SODIUM SCH UNITS: 10000 INJECTION, SOLUTION INTRAVENOUS; SUBCUTANEOUS at 08:59

## 2017-02-24 RX ADMIN — HUMAN INSULIN SCH UNITS: 100 INJECTION, SUSPENSION SUBCUTANEOUS at 08:56

## 2017-02-24 RX ADMIN — DICYCLOMINE HYDROCHLORIDE SCH MG: 20 TABLET ORAL at 11:08

## 2017-02-24 RX ADMIN — DICYCLOMINE HYDROCHLORIDE SCH MG: 20 TABLET ORAL at 09:00

## 2017-02-24 RX ADMIN — SODIUM CHLORIDE, PRESERVATIVE FREE SCH ML: 5 INJECTION INTRAVENOUS at 22:16

## 2017-02-24 RX ADMIN — CIPROFLOXACIN HYDROCHLORIDE SCH MG: 750 TABLET, FILM COATED ORAL at 22:16

## 2017-02-24 RX ADMIN — PIOGLITAZONE SCH MG: 45 TABLET ORAL at 08:52

## 2017-02-24 RX ADMIN — TAZOBACTAM SODIUM AND PIPERACILLIN SODIUM SCH MLS/HR: 375; 3 INJECTION, SOLUTION INTRAVENOUS at 08:53

## 2017-02-24 RX ADMIN — DICYCLOMINE HYDROCHLORIDE SCH MG: 20 TABLET ORAL at 15:59

## 2017-02-24 RX ADMIN — ATENOLOL SCH MG: 50 TABLET ORAL at 08:53

## 2017-02-24 RX ADMIN — HUMAN INSULIN SCH UNITS: 100 INJECTION, SUSPENSION SUBCUTANEOUS at 22:15

## 2017-02-24 RX ADMIN — INSULIN ASPART SCH: 100 INJECTION, SOLUTION INTRAVENOUS; SUBCUTANEOUS at 21:00

## 2017-02-24 RX ADMIN — CIPROFLOXACIN HYDROCHLORIDE SCH MG: 750 TABLET, FILM COATED ORAL at 08:52

## 2017-02-24 RX ADMIN — INSULIN ASPART SCH: 100 INJECTION, SOLUTION INTRAVENOUS; SUBCUTANEOUS at 16:09

## 2017-02-24 RX ADMIN — INSULIN ASPART SCH: 100 INJECTION, SOLUTION INTRAVENOUS; SUBCUTANEOUS at 06:22

## 2017-02-24 RX ADMIN — TAZOBACTAM SODIUM AND PIPERACILLIN SODIUM SCH MLS/HR: 375; 3 INJECTION, SOLUTION INTRAVENOUS at 03:56

## 2017-02-24 RX ADMIN — ASPIRIN SCH MG: 81 TABLET ORAL at 08:52

## 2017-02-24 RX ADMIN — TAZOBACTAM SODIUM AND PIPERACILLIN SODIUM SCH MLS/HR: 375; 3 INJECTION, SOLUTION INTRAVENOUS at 22:46

## 2017-02-24 RX ADMIN — HEPARIN SODIUM SCH UNITS: 10000 INJECTION, SOLUTION INTRAVENOUS; SUBCUTANEOUS at 22:15

## 2017-02-24 RX ADMIN — SODIUM CHLORIDE, PRESERVATIVE FREE SCH ML: 5 INJECTION INTRAVENOUS at 08:53

## 2017-02-24 RX ADMIN — INSULIN ASPART SCH: 100 INJECTION, SOLUTION INTRAVENOUS; SUBCUTANEOUS at 11:00

## 2017-02-24 RX ADMIN — FINASTERIDE SCH MG: 5 TABLET, FILM COATED ORAL at 22:16

## 2017-02-24 RX ADMIN — PANTOPRAZOLE SODIUM SCH MG: 40 INJECTION, POWDER, FOR SOLUTION INTRAVENOUS at 08:54

## 2017-02-24 RX ADMIN — TAMSULOSIN HYDROCHLORIDE SCH MG: 0.4 CAPSULE ORAL at 22:15

## 2017-02-24 NOTE — HHI.IDPN
Subjective


Subjective


Remarks


Notes reviewed


No fever


No new (+) BC


WBC scan with some uptake in mouth - no complaints however


LFTs improving


Repeat UA now with pyuria


Antibiotics


Zosyn


Cipro


Lines


PIV


Past Medical History


HTN


DM Type 2


Urinary Obstruction and retention


BPH


Past Surgical History


Cystoscopy


Allergies:  


Coded Allergies:  


     Shellfish (Verified  Allergy, Severe, Anaphylaxis, 2/21/17)





Objective


.





 Vital Signs








  Date Time  Temp Pulse Resp B/P Pulse Ox O2 Delivery O2 Flow Rate FiO2


 


2/24/17 12:02 97.2 60 16 129/86 97   


 


2/24/17 08:02 97.1 56 16 137/87 98   


 


2/24/17 04:00 97.4 56 16 130/81 95   


 


2/24/17 00:00 97.5 54 16 128/74 96   


 


2/23/17 20:30 97.6 54 16 147/92 93   














 2/23/17 2/23/17 2/24/17





 15:00 23:00 07:00


 


Intake Total 980 ml 960 ml 240 ml


 


Balance 980 ml 960 ml 240 ml


 


   


 


Intake Oral 980 ml 960 ml 240 ml


 


# Voids 4 5 1


 


# Bowel Movements  1 0








.





Laboratory Tests








Test 2/23/17





 06:23


 


White Blood Count 10.1 TH/MM3


 


Red Blood Count 4.58 MIL/MM3


 


Hemoglobin 12.6 GM/DL


 


Hematocrit 38.9 %


 


Mean Corpuscular Volume 84.9 FL


 


Mean Corpuscular Hemoglobin 27.5 PG


 


Mean Corpuscular Hemoglobin 32.4 %





Concent 


 


Red Cell Distribution Width 13.3 %


 


Platelet Count 147 TH/MM3


 


Mean Platelet Volume 10.1 FL


 


Neutrophils (%) (Auto) 64.4 %


 


Lymphocytes (%) (Auto) 20.2 %


 


Monocytes (%) (Auto) 13.2 %


 


Eosinophils (%) (Auto) 1.6 %


 


Basophils (%) (Auto) 0.6 %


 


Neutrophils # (Auto) 6.5 TH/MM3


 


Lymphocytes # (Auto) 2.0 TH/MM3


 


Monocytes # (Auto) 1.3 TH/MM3


 


Eosinophils # (Auto) 0.2 TH/MM3


 


Basophils # (Auto) 0.1 TH/MM3


 


CBC Comment DIFF FINAL 


 


Differential Comment  








Laboratory Tests








Test 2/23/17 2/23/17





 06:23 10:43


 


Sodium Level 140 MEQ/L 


 


Potassium Level 3.9 MEQ/L 


 


Chloride Level 109 MEQ/L 


 


Carbon Dioxide Level 25.5 MEQ/L 


 


Anion Gap 6 MEQ/L 


 


Blood Urea Nitrogen 15 MG/DL 


 


Creatinine 1.26 MG/DL 


 


Estimat Glomerular Filtration 70 ML/MIN 





Rate  


 


Random Glucose 129 MG/DL 


 


Calcium Level 8.6 MG/DL 


 


Total Bilirubin 1.3 MG/DL 


 


Aspartate Amino Transf 85 U/L 





(AST/SGOT)  


 


Alanine Aminotransferase 262 U/L 





(ALT/SGPT)  


 


Alkaline Phosphatase 127 U/L 


 


Total Protein 6.9 GM/DL 


 


Albumin 3.1 GM/DL 


 


Lactic Acid Level  0.8 mmol/L








Microbiology








 Date/Time Procedure Status





Source Growth 


 


 2/22/17 16:04 Aerobic Blood Culture - Preliminary Resulted





Blood Peripheral NO GROWTH IN 2 DAYS 





 2/22/17 16:04 Anaerobic Blood Culture - Final Resulted





Blood Peripheral QNS - SEE AEROBE REPORT 


 


 2/22/17 16:04 Aerobic Blood Culture - Preliminary Resulted





Blood Peripheral NO GROWTH IN 2 DAYS 





 2/22/17 16:04 Anaerobic Blood Culture - Final Resulted





Blood Peripheral QNS - SEE AEROBE REPORT 








Imaging














Gall Bladder Ultrasound 2/21/17 0228 Signed





Impressions: 





 Service Date/Time:  Tuesday, February 21, 2017 02:51 - CONCLUSION: Fatty 

liver.  





    López Peters MD 


 


Abdomen/Pelvis CT 2/21/17 0103 Signed





Impressions: 





 Service Date/Time:  Tuesday, February 21, 2017 01:51 - CONCLUSION:  1. 

Prostatic 





 enlargement with suspected very prominent compression on the bladder floor 





 creating a masslike area in the bladder floor. 2. Fatty infiltration liver. 3. 





 Nonobstructing left renal stone. 4. Bibasilar areas of consolidation or 





 atelectasis at the lung bases    López Peters MD 


 


Chest X-Ray 2/21/17 0018 Signed





Impressions: 





 Service Date/Time:  Tuesday, February 21, 2017 00:34 - CONCLUSION:  Expiratory 





 chest x-ray with suspected borderline cardiomegaly.     López Peters MD 








Physical Exam


GENERAL:   awake and alert,  NAD


SKIN:   Warm and dry.  No generalized rash 


HEENT:  Pink conjunctivae, no petechia or hemorrhage.  Moist oral mucosa.  


NECK:  Supple, nontender, no meningeal signs.


CARDIOVASCULAR: Regular rate and rhythm without murmurs, gallops, or rubs. 


RESPIRATORY: Clear to auscultation. Breath sounds equal bilaterally. No wheezes

, rales, or rhonchi.  


GASTROINTESTINAL: Abdomen soft, bowel sounds are present and normoactive, not 

distended, not tender.  No guarding. No rebound


MUSCULOSKELETAL: Extremities without clubbing, cyanosis, or edema.  No calf 

tenderness.  


NEUROLOGICAL:   Non-focal


PSYCH:  Normal affect, calm and cooperative


LINE:  PIV with no evidence of infection





Assessment & Plan


Remarks


IMPRESSION


Klebsiella and Enterobacter bacteremia, source?


   -  ?GI/biliary, ?


   -  ?prostate


   -  clinically no evidence of PNA,  CT findings likely more of atelectasis 

than consolidation


   -  ?intermittent incomplete emptying


   -  no dental complaints


Had previous PSAE bacteremia


Previous problem with urinary retention


Enlarged prostate


Renal insufficiency


Diabetes








RECOMMENDATION


Continue IV Zosyn


Also on Cipro


Await sensitivity result


Monitor progress


Will get WBC scan to see if any other occult source of infection








Kylah Duarte MD Feb 24, 2017 15:46

## 2017-02-24 NOTE — RADRPT
EXAM DATE/TIME:  2017 15:59 

 

[facility] COMPARISON:     

CT ABDOMEN & PELVIS W CONTRAST, 2017, 1:51.

 

 

INDICATIONS :     

GERD, HLD, DM, MI

                       

 

DOSE:      

21.7 mCi Tc99m Ceretec labeled white blood cells IV 

                       

 

PLANAR IMAGIN min, 3 hrs, 20 hrs 

                       

 

MEDICAL HISTORY :     

Myocardial infarction. Diabetes mellitus type 2. Gastroesophageal reflux disease. Hypertension. Enlar
ged prostate. Strokes.

 

SURGICAL HISTORY :      

Coronary artery stent.    

 

ENCOUNTER:     

Initial

 

ACUITY:     

3 days

 

PAIN SCALE:     

0/10

 

LOCATION:        

 

TECHNIQUE:     

Following the in vitro labeling of autologous white cells and reinjection, whole body scan was perfor
med at specified times.

 

FINDINGS:     

There is no abnormal biodistribution of radiotracer in the thorax, abdomen or pelvis. There is some f
ocal uptake in the region of the right maxilla. This would suggest dental disease..

 

CONCLUSION:     

1. Probable right-sided dental disease.

2. Otherwise, unremarkable exam.

 

 

 

 Evin Daily MD on 2017 at 13:19           

Board Certified Radiologist.

 This report was verified electronically.

## 2017-02-24 NOTE — HHI.GIFU
Subjective


Remarks


Pt eating lunch.  No abd pain, n/v, diarrhea.





Objective


Vitals I&O





 Vital Signs








  Date Time  Temp Pulse Resp B/P Pulse Ox O2 Delivery O2 Flow Rate FiO2


 


2/24/17 08:02 97.1 56 16 137/87 98   


 


2/24/17 04:00 97.4 56 16 130/81 95   


 


2/24/17 00:00 97.5 54 16 128/74 96   


 


2/23/17 20:30 97.6 54 16 147/92 93   








 I/O








 2/23/17 2/23/17 2/23/17 2/24/17 2/24/17 2/24/17





 07:00 15:00 23:00 07:00 15:00 23:00


 


Intake Total 240 ml 980 ml 960 ml 240 ml  


 


Output Total 300 ml     


 


Balance -60 ml 980 ml 960 ml 240 ml  


 


      


 


Intake Oral 240 ml 980 ml 960 ml 240 ml  


 


Output Urine Total 300 ml     


 


# Voids  4 5 1  


 


# Bowel Movements 0  1 0  








Laboratory











 Date/Time Procedure Status





Source Growth 


 


 2/22/17 16:04 Aerobic Blood Culture - Preliminary Resulted





Blood Peripheral NO GROWTH IN 2 DAYS 





 2/22/17 16:04 Anaerobic Blood Culture - Final Resulted





Blood Peripheral QNS - SEE AEROBE REPORT 


 


 2/21/17 01:21 Influenza Types A,B Antigen (EFREN) - Final Complete





Nasal Washing NEGATIVE FOR FLU A AND B ANTIGEN.... 








Imaging





Last Impressions








Gall Bladder Ultrasound 2/21/17 0228 Signed





Impressions: 





 Service Date/Time:  Tuesday, February 21, 2017 02:51 - CONCLUSION: Fatty 

liver.  





    López Peters MD 


 


Abdomen/Pelvis CT 2/21/17 0103 Signed





Impressions: 





 Service Date/Time:  Tuesday, February 21, 2017 01:51 - CONCLUSION:  1. 

Prostatic 





 enlargement with suspected very prominent compression on the bladder floor 





 creating a masslike area in the bladder floor. 2. Fatty infiltration liver. 3. 





 Nonobstructing left renal stone. 4. Bibasilar areas of consolidation or 





 atelectasis at the lung bases    López Peters MD 


 


Chest X-Ray 2/21/17 0018 Signed





Impressions: 





 Service Date/Time:  Tuesday, February 21, 2017 00:34 - CONCLUSION:  Expiratory 





 chest x-ray with suspected borderline cardiomegaly.     López Peters MD 








Physical Exam


HEENT: Normocephalic; atraumatic; no jaundice.  


CHEST:  CTA


CARDIAC:  RRR


ABDOMEN:  Soft, nondistended, nontender; no hepatosplenomegaly; bowel sounds 

are present in all four quadrants.


EXTREMITIES: No clubbing, cyanosis, or edema.


SKIN:  Normal; no rash; no jaundice.


CNS:  No focal deficits; alert and oriented times three.





Assessment and Plan


Plan


ASSESSMENT:


- Abdominal pain improving.  Came in for chills, malaise, diffuse abdominal 

cramping from his epigastric area to his entire abdomen/back.  Denies suspicious


   food, sick contacts.  Unclear if he was recently on abx.  Abdomen/Pelvis CT (

2/21/17)--->  1. Prostatic enlargement with suspected very prominent


   compression on the bladder floor creating a masslike area in the bladder 

floor. 2. Fatty infiltration liver. 3. Nonobstructing left renal stone. 4. 

Bibasilar


   areas of consolidation or atelectasis at the lung bases.  Gall Bladder 

Ultrasound (2/21/17)---> Fatty liver.  WBC normal 10.1.  Lipase 129.  on 


   admission.  LFTs trending down with T. Bili 1.3,, AST 85,, , Alk 

Phosph 127.  These were normal in November of 2016. He last had a colonoscopy 


   1 year ago, cannot state who did this.  Now on Cipro.  IMPROVED.  PPI


- Elevated LFTs.  CT/US as above, fatty liver.  Lipase normal.  LFTs trending 

down with T. Bili 1.3,, AST 85,, , Alk Phosph 127.  These were normal in 


   November of 2016.  AFP 1.7, Alpha 1 Antitrypsin 119, Ceruloplasmin 24 WNL, 

Hepatitis negative, JENNIFER neg, AMA nonspecifically elevated 24.9, ASMA neg, alpha 


   1 antitrypsin normal Celiac panel neg.  Ferritin 477, Iron Saturation 5.1%. 

  


- GREGG with abnormal imaging of his bladder.  CT with Prostatic enlargement with 

suspected very prominent compression on the bladder floor creating a masslike


   area in the bladder floor.  He has a hx of BPH with urinary retention, 

requiring kirk catheter, pathology negative.  He reports that he had the 

catheter


   removed in December. Creat 1.26


- Questionable PNA.  S/P dose of azithromycin/ceftriaxone.  Cipro,zosyn.


- HTN, DM, Hx TIA, CAD.  Per primary





PLAN:


- ASHER


- PO PPI QD


- Dicyclomine


- LFTs in am


- Avoid hepatotoxic meds


- Supportive care


- If LFTs remain persistently elevated or worsen, consider liver biopsy


- Further recommendations to follow based on the results of above


- Pt seen and examined by Dr. Palmer and this note is written on his behalf








Tosha Flynn Feb 24, 2017 12:23

## 2017-02-24 NOTE — HHI.PR
Subjective


Remarks


Patient went for scan , seen when he returned. Family at bedside. No n/v/d/c. 

Patient feels very tired. Says he is gettign sob with movement. No fever or 

chills.





Objective


Vitals





 Vital Signs








  Date Time  Temp Pulse Resp B/P Pulse Ox O2 Delivery O2 Flow Rate FiO2


 


2/24/17 12:02 97.2 60 16 129/86 97   


 


2/24/17 08:02 97.1 56 16 137/87 98   


 


2/24/17 04:00 97.4 56 16 130/81 95   


 


2/24/17 00:00 97.5 54 16 128/74 96   


 


2/23/17 20:30 97.6 54 16 147/92 93   








 I/O








 2/23/17 2/23/17 2/23/17 2/24/17 2/24/17 2/24/17





 07:00 15:00 23:00 07:00 15:00 23:00


 


Intake Total 240 ml 980 ml 960 ml 240 ml  


 


Output Total 300 ml     


 


Balance -60 ml 980 ml 960 ml 240 ml  


 


      


 


Intake Oral 240 ml 980 ml 960 ml 240 ml  


 


Output Urine Total 300 ml     


 


# Voids  4 5 1  


 


# Bowel Movements 0  1 0  








Result Diagram:  


2/23/17 0623 2/23/17 0623





Objective Remarks


GENERAL:  65 yo male, well nourished, well developed patient, in not acute 

distress. 


SKIN: Warm and dry.


HEAD: Atraumatic. Normocephalic. 


EYES: Pupils equal and round. No scleral icterus. No injection or drainage. 


ENT: No nasal bleeding or discharge.  Mucous membranes pink and moist.


NECK: Trachea midline. No JVD. 


CARDIOVASCULAR: Regular rate and rhythm.  


RESPIRATORY: No accessory muscle use. Clear to auscultation. Breath sounds 

equal bilaterally. 


GASTROINTESTINAL: Abdomen soft, non-tender, nondistended. Hepatic and splenic 

margins not palpable. 


MUSCULOSKELETAL: Extremities without clubbing, cyanosis, or edema. No obvious 

deformities. 


NEUROLOGICAL: Awake and alert. No obvious cranial nerve deficits.  Motor 

grossly within normal limits. Five out of 5 muscle strength in the arms and 

legs.  Normal speech.


PSYCHIATRIC: Appropriate mood and affect; insight and judgment normal.


Procedures


None





A/P


Problem List:  


(1) Pneumonia


ICD Code:  J18.9


Status:  Acute


(2) Sepsis


ICD Code:  A41.9


Status:  Acute


(3) Elevated liver enzymes


ICD Code:  R74.8


Status:  Acute


(4) HTN (hypertension)


ICD Code:  I10


Status:  Chronic


(5) DM (diabetes mellitus)


ICD Code:  E11.9


Status:  Chronic


(6) Bacteremia


ICD Code:  R78.81


Status:  Acute


(7) Leukocytosis


ICD Code:  D72.829


Status:  Acute


Assessment and Plan


Abdominal pain: Uncertain etiology. Continue workup. Appreciate GI 

recommendations.


Elevated transaminases: Patient denies alcohol use. Ultrasound shows fatty 

liver. Monitor labs. Hepatitis panel is negative. LFTs are trending down. Poss 

acute 2/2 sepsis. 


Sepsis secondary to pneumonia/also bacteremia: Patient with fever, tachycardia 

on admission. Suspected source is pneumonia. CT showed areas of consolidation 

versus atelectasis in the lung bases. 


Bacteremia: Blood culture growing GNR Klebsiella pneumoniae and Enterobacter 

species. Repeat Blood cultures 2/22/17 NTD. Monitor unril neg Blood cx.  

Continue antibiotics. Infectious disease specialist consulted and following. 


ID following appreciate recommendations. Patient went for RBC scan to evaluate 

for occult infection


Hypertension: Fairly controlled Continue atenolol, amlodipine. Monitor VS and 

adjust meds if need. Vasotec IV prn if high BP


GREGG. Continue IVF. Monitor Kidney indices. Avoid nephrotoxics. UA neg. Pt also 

has BPH. 


Diabetes mellitus type 2, controlled A1c 5.3 9/16. Monitor Accu-Cheks and cover 

with sliding scale insulin. Continue 70/30 insulin. Hold Janumet secondary to 

renal insufficiency. Continue Invokana, pioglitazone.


BPH: Continue Flomax, Proscar. Follow up with Dr Comer as OP. Has an 

appointment in April. 








DVT prophylaxis: Heparin.


Discussed with patient, nurse, family








Shea Villarreal MD Feb 24, 2017 14:59

## 2017-02-25 VITALS
OXYGEN SATURATION: 95 % | HEART RATE: 53 BPM | SYSTOLIC BLOOD PRESSURE: 141 MMHG | RESPIRATION RATE: 16 BRPM | TEMPERATURE: 96.8 F | DIASTOLIC BLOOD PRESSURE: 75 MMHG

## 2017-02-25 VITALS — DIASTOLIC BLOOD PRESSURE: 82 MMHG | SYSTOLIC BLOOD PRESSURE: 157 MMHG | OXYGEN SATURATION: 98 % | HEART RATE: 54 BPM

## 2017-02-25 VITALS
HEART RATE: 56 BPM | OXYGEN SATURATION: 100 % | RESPIRATION RATE: 16 BRPM | SYSTOLIC BLOOD PRESSURE: 147 MMHG | DIASTOLIC BLOOD PRESSURE: 95 MMHG | TEMPERATURE: 96.8 F

## 2017-02-25 VITALS
OXYGEN SATURATION: 96 % | RESPIRATION RATE: 16 BRPM | DIASTOLIC BLOOD PRESSURE: 80 MMHG | SYSTOLIC BLOOD PRESSURE: 130 MMHG | HEART RATE: 50 BPM | TEMPERATURE: 96.5 F

## 2017-02-25 VITALS
DIASTOLIC BLOOD PRESSURE: 81 MMHG | TEMPERATURE: 96.6 F | SYSTOLIC BLOOD PRESSURE: 138 MMHG | RESPIRATION RATE: 17 BRPM | HEART RATE: 51 BPM | OXYGEN SATURATION: 97 %

## 2017-02-25 VITALS
DIASTOLIC BLOOD PRESSURE: 95 MMHG | RESPIRATION RATE: 17 BRPM | HEART RATE: 65 BPM | SYSTOLIC BLOOD PRESSURE: 154 MMHG | OXYGEN SATURATION: 95 % | TEMPERATURE: 96.2 F

## 2017-02-25 VITALS
RESPIRATION RATE: 17 BRPM | DIASTOLIC BLOOD PRESSURE: 98 MMHG | HEART RATE: 59 BPM | OXYGEN SATURATION: 96 % | SYSTOLIC BLOOD PRESSURE: 163 MMHG | TEMPERATURE: 95.7 F

## 2017-02-25 VITALS — HEART RATE: 62 BPM

## 2017-02-25 VITALS — OXYGEN SATURATION: 97 %

## 2017-02-25 VITALS — HEART RATE: 59 BPM

## 2017-02-25 LAB
ALP SERPL-CCNC: 129 U/L (ref 45–117)
ALT SERPL-CCNC: 175 U/L (ref 12–78)
ANION GAP SERPL CALC-SCNC: 8 MEQ/L (ref 5–15)
AST SERPL-CCNC: 65 U/L (ref 15–37)
BASOPHILS # BLD AUTO: 0.1 TH/MM3 (ref 0–0.2)
BASOPHILS NFR BLD: 0.6 % (ref 0–2)
BILIRUB INDIRECT SERPL-MCNC: 0.7 MG/DL (ref 0–0.8)
BILIRUB SERPL-MCNC: 0.8 MG/DL (ref 0.2–1)
BUN SERPL-MCNC: 13 MG/DL (ref 7–18)
CHLORIDE SERPL-SCNC: 104 MEQ/L (ref 98–107)
EOSINOPHIL # BLD: 0.3 TH/MM3 (ref 0–0.4)
EOSINOPHIL NFR BLD: 2.7 % (ref 0–4)
ERYTHROCYTE [DISTWIDTH] IN BLOOD BY AUTOMATED COUNT: 12.9 % (ref 11.6–17.2)
GFR SERPLBLD BASED ON 1.73 SQ M-ARVRAT: 67 ML/MIN (ref 89–?)
HCO3 BLD-SCNC: 27.3 MEQ/L (ref 21–32)
HCT VFR BLD CALC: 40 % (ref 39–51)
HEMO FLAGS: (no result)
LYMPHOCYTES # BLD AUTO: 2.6 TH/MM3 (ref 1–4.8)
LYMPHOCYTES NFR BLD AUTO: 27.3 % (ref 9–44)
MCH RBC QN AUTO: 28.5 PG (ref 27–34)
MCHC RBC AUTO-ENTMCNC: 34 % (ref 32–36)
MCV RBC AUTO: 83.9 FL (ref 80–100)
MONOCYTES NFR BLD: 10.3 % (ref 0–8)
NEUTROPHILS # BLD AUTO: 5.6 TH/MM3 (ref 1.8–7.7)
NEUTROPHILS NFR BLD AUTO: 59.1 % (ref 16–70)
PLATELET # BLD: 205 TH/MM3 (ref 150–450)
POTASSIUM SERPL-SCNC: 3.8 MEQ/L (ref 3.5–5.1)
RBC # BLD AUTO: 4.76 MIL/MM3 (ref 4.5–5.9)
SODIUM SERPL-SCNC: 139 MEQ/L (ref 136–145)
WBC # BLD AUTO: 9.4 TH/MM3 (ref 4–11)

## 2017-02-25 RX ADMIN — TAZOBACTAM SODIUM AND PIPERACILLIN SODIUM SCH MLS/HR: 375; 3 INJECTION, SOLUTION INTRAVENOUS at 09:49

## 2017-02-25 RX ADMIN — INSULIN ASPART SCH: 100 INJECTION, SOLUTION INTRAVENOUS; SUBCUTANEOUS at 06:43

## 2017-02-25 RX ADMIN — PANTOPRAZOLE SCH MG: 40 TABLET, DELAYED RELEASE ORAL at 09:50

## 2017-02-25 RX ADMIN — HUMAN INSULIN SCH UNITS: 100 INJECTION, SUSPENSION SUBCUTANEOUS at 09:50

## 2017-02-25 RX ADMIN — INSULIN ASPART SCH: 100 INJECTION, SOLUTION INTRAVENOUS; SUBCUTANEOUS at 11:00

## 2017-02-25 RX ADMIN — TAZOBACTAM SODIUM AND PIPERACILLIN SODIUM SCH MLS/HR: 375; 3 INJECTION, SOLUTION INTRAVENOUS at 02:53

## 2017-02-25 RX ADMIN — FINASTERIDE SCH MG: 5 TABLET, FILM COATED ORAL at 20:56

## 2017-02-25 RX ADMIN — CIPROFLOXACIN HYDROCHLORIDE SCH MG: 750 TABLET, FILM COATED ORAL at 09:51

## 2017-02-25 RX ADMIN — TAMSULOSIN HYDROCHLORIDE SCH MG: 0.4 CAPSULE ORAL at 20:56

## 2017-02-25 RX ADMIN — TAZOBACTAM SODIUM AND PIPERACILLIN SODIUM SCH MLS/HR: 375; 3 INJECTION, SOLUTION INTRAVENOUS at 20:55

## 2017-02-25 RX ADMIN — TAZOBACTAM SODIUM AND PIPERACILLIN SODIUM SCH MLS/HR: 375; 3 INJECTION, SOLUTION INTRAVENOUS at 15:53

## 2017-02-25 RX ADMIN — DICYCLOMINE HYDROCHLORIDE SCH MG: 20 TABLET ORAL at 09:51

## 2017-02-25 RX ADMIN — INSULIN ASPART SCH: 100 INJECTION, SOLUTION INTRAVENOUS; SUBCUTANEOUS at 15:57

## 2017-02-25 RX ADMIN — INSULIN ASPART SCH: 100 INJECTION, SOLUTION INTRAVENOUS; SUBCUTANEOUS at 20:56

## 2017-02-25 RX ADMIN — HEPARIN SODIUM SCH UNITS: 10000 INJECTION, SOLUTION INTRAVENOUS; SUBCUTANEOUS at 20:55

## 2017-02-25 RX ADMIN — SODIUM CHLORIDE, PRESERVATIVE FREE SCH ML: 5 INJECTION INTRAVENOUS at 20:56

## 2017-02-25 RX ADMIN — HUMAN INSULIN SCH UNITS: 100 INJECTION, SUSPENSION SUBCUTANEOUS at 20:55

## 2017-02-25 RX ADMIN — DICYCLOMINE HYDROCHLORIDE SCH MG: 20 TABLET ORAL at 17:53

## 2017-02-25 RX ADMIN — PIOGLITAZONE SCH MG: 45 TABLET ORAL at 09:51

## 2017-02-25 RX ADMIN — DICYCLOMINE HYDROCHLORIDE SCH MG: 20 TABLET ORAL at 13:39

## 2017-02-25 RX ADMIN — ATENOLOL SCH MG: 25 TABLET ORAL at 09:00

## 2017-02-25 RX ADMIN — HEPARIN SODIUM SCH UNITS: 10000 INJECTION, SOLUTION INTRAVENOUS; SUBCUTANEOUS at 09:50

## 2017-02-25 RX ADMIN — SODIUM CHLORIDE, PRESERVATIVE FREE SCH ML: 5 INJECTION INTRAVENOUS at 09:52

## 2017-02-25 RX ADMIN — ASPIRIN SCH MG: 81 TABLET ORAL at 09:51

## 2017-02-25 NOTE — HHI.GIFU
Subjective


Remarks


Resting in bed.  No distress. No n/v. No abdominal pain. No diarrhea.  

Tolerating diet. (Tosha Flynn)





Objective


Vitals I&O





 Vital Signs








  Date Time  Temp Pulse Resp B/P Pulse Ox O2 Delivery O2 Flow Rate FiO2


 


2/25/17 11:31 96.2 65 17 154/95 95   


 


2/25/17 07:15  59      


 


2/25/17 07:05 95.7 59 17 163/98 96   


 


2/25/17 03:37 96.8 53 16 141/75 95   


 


2/25/17 02:13  54  157/82 98   


 


2/25/17 00:00 96.5 50 16 130/80 96   


 


2/24/17 23:50  58      


 


2/24/17 20:27 97.3 52 16 136/80 96   


 


2/24/17 18:48      Room Air  


 


2/24/17 18:47  54      


 


2/24/17 18:31     96   21








 I/O








 2/24/17 2/24/17 2/24/17 2/25/17 2/25/17 2/25/17





 07:00 15:00 23:00 07:00 15:00 23:00


 


Intake Total 240 ml 1080 ml 480 ml 449 ml  


 


Output Total    200 ml  


 


Balance 240 ml 1080 ml 480 ml 249 ml  


 


      


 


Intake Oral 240 ml 1080 ml 480 ml 240 ml  


 


IV Total    209 ml  


 


Output Urine Total    200 ml  


 


# Voids 1 5 3 1  


 


# Bowel Movements 0  1 0  








Laboratory





Laboratory Tests








Test 2/25/17





 04:50


 


White Blood Count 9.4 


 


Red Blood Count 4.76 


 


Hemoglobin 13.6 


 


Hematocrit 40.0 


 


Mean Corpuscular Volume 83.9 


 


Mean Corpuscular Hemoglobin 28.5 


 


Mean Corpuscular Hemoglobin 34.0 





Concent 


 


Red Cell Distribution Width 12.9 


 


Platelet Count 205 


 


Mean Platelet Volume 10.0 


 


Neutrophils (%) (Auto) 59.1 


 


Lymphocytes (%) (Auto) 27.3 


 


Monocytes (%) (Auto) 10.3 


 


Eosinophils (%) (Auto) 2.7 


 


Basophils (%) (Auto) 0.6 


 


Neutrophils # (Auto) 5.6 


 


Lymphocytes # (Auto) 2.6 


 


Monocytes # (Auto) 1.0 


 


Eosinophils # (Auto) 0.3 


 


Basophils # (Auto) 0.1 


 


CBC Comment DIFF FINAL 


 


Differential Comment  


 


Sodium Level 139 


 


Potassium Level 3.8 


 


Chloride Level 104 


 


Carbon Dioxide Level 27.3 


 


Anion Gap 8 


 


Blood Urea Nitrogen 13 


 


Creatinine 1.31 


 


Estimat Glomerular Filtration 67 





Rate 


 


Random Glucose 171 


 


Calcium Level 8.6 


 


Total Bilirubin 0.8 


 


Direct Bilirubin 0.1 


 


Indirect Bilirubin 0.7 


 


Aspartate Amino Transf 65 





(AST/SGOT) 


 


Alanine Aminotransferase 175 





(ALT/SGPT) 


 


Alkaline Phosphatase 129 


 


Total Protein 7.4 


 


Albumin 3.0 














 Date/Time Procedure Status





Source Growth 


 


 2/24/17 16:55 Urine Culture - Preliminary Resulted





Urine Clean Catch NO GROWTH IN 24 HOURS. 


 


 2/22/17 16:04 Aerobic Blood Culture - Preliminary Resulted





Blood Peripheral NO GROWTH IN 3 DAYS 





 2/22/17 16:04 Anaerobic Blood Culture - Final Resulted





Blood Peripheral QNS - SEE AEROBE REPORT 


 


 2/21/17 01:21 Influenza Types A,B Antigen (EFREN) - Final Complete





Nasal Washing NEGATIVE FOR FLU A AND B ANTIGEN.... 








Imaging





Last Impressions








Tumor Localization 2/23/17 0000 Signed





Impressions: 





 Service Date/Time:  Thursday, February 23, 2017 15:59 - CONCLUSION:  1. 

Probable 





 right-sided dental disease. 2. Otherwise, unremarkable exam.     Evin Daily MD 


 


Gall Bladder Ultrasound 2/21/17 0228 Signed





Impressions: 





 Service Date/Time:  Tuesday, February 21, 2017 02:51 - CONCLUSION: Fatty 

liver.  





    López Peters MD 


 


Abdomen/Pelvis CT 2/21/17 0103 Signed





Impressions: 





 Service Date/Time:  Tuesday, February 21, 2017 01:51 - CONCLUSION:  1. 

Prostatic 





 enlargement with suspected very prominent compression on the bladder floor 





 creating a masslike area in the bladder floor. 2. Fatty infiltration liver. 3. 





 Nonobstructing left renal stone. 4. Bibasilar areas of consolidation or 





 atelectasis at the lung bases    López Peters MD 


 


Chest X-Ray 2/21/17 0018 Signed





Impressions: 





 Service Date/Time:  Tuesday, February 21, 2017 00:34 - CONCLUSION:  Expiratory 





 chest x-ray with suspected borderline cardiomegaly.     López Peters MD 








Physical Exam


HEENT: Normocephalic; atraumatic; no jaundice.  


CHEST:  CTA


CARDIAC:  RRR


ABDOMEN:  Soft, nondistended, nontender; no hepatosplenomegaly; bowel sounds 

are present in all four quadrants.


EXTREMITIES: No clubbing, cyanosis, or edema.


SKIN:  Normal; no rash; no jaundice.


CNS:  No focal deficits; alert and oriented times three. (Tosha Flynn)





Assessment and Plan


Plan


ASSESSMENT:


- Abdominal pain improving.  Came in for chills, malaise, diffuse abdominal 

cramping from his epigastric area to his entire abdomen/back.  Denies suspicious


   food, sick contacts.  Unclear if he was recently on abx.  Abdomen/Pelvis CT (

2/21/17)--->  1. Prostatic enlargement with suspected very prominent


   compression on the bladder floor creating a masslike area in the bladder 

floor. 2. Fatty infiltration liver. 3. Nonobstructing left renal stone. 4. 

Bibasilar


   areas of consolidation or atelectasis at the lung bases.  Gall Bladder 

Ultrasound (2/21/17)---> Fatty liver.  WBC normal 10.1.  Lipase 129.  on 


   admission.  LFTs trending down with T. Bili 0.8, AST 65, , Alk Phosph 

129.  These were normal in November of 2016. He last had a colonoscopy 


   1 year ago, cannot state who did this.  Now on Cipro. ABDOMINAL PAIN HAS 

RESOLVED.  PPI


- Elevated LFTs.  CT/US as above, fatty liver.  Lipase normal.  LFTs trending 

down. November of 2016.  AFP 1.7, Alpha 1 Antitrypsin 119, Ceruloplasmin 24 WNL

, 


   Hepatitis negative, JENNIFER neg, AMA nonspecifically elevated 24.9, ASMA neg, 

alpha 1 antitrypsin normal Celiac panel neg.  Ferritin 477, Iron Saturation 5.1%

.   


- GREGG with abnormal imaging of his bladder.  CT with Prostatic enlargement with 

suspected very prominent compression on the bladder floor creating a masslike


   area in the bladder floor.  He has a hx of BPH with urinary retention, 

requiring kirk catheter, pathology negative.  He reports that he had the 

catheter


   removed in December.  Creat 1.31


- Questionable PNA.  S/P dose of azithromycin/ceftriaxone.  Cipro,zosyn.  S/P 

WBC Scan.


- HTN, DM, Hx TIA, CAD.  Per primary





PLAN:


- ASHER


- PPI


- Dicyclomine


- Avoid hepatotoxic meds


- GI will sign off, please reconsult as needed


- Pt seen and examined by Dr. Palmer and this note is written on his behalf (

Tosha Flynn)


Physician Comments


patient was seen and examined, agree with above note, FU LFTs in 1 wks as 

outpatient and FU with GI in 10 days (Pauline Palmer MD)








Tosha Flynn Feb 25, 2017 16:41


Pauline Palmer MD Feb 25, 2017 17:01

## 2017-02-25 NOTE — HHI.PR
Subjective


Remarks


Patient in nad. Denies chest pain, n/v/d/c. 


In the chair.


feels tiired. SOB improving. No fever or chills. 


Patient with dental infection per scan, I adviced patient to follow up with the 

dentist.





Objective


Vitals





 Vital Signs








  Date Time  Temp Pulse Resp B/P Pulse Ox O2 Delivery O2 Flow Rate FiO2


 


2/25/17 15:20 96.6 51 17 138/81 97   


 


2/25/17 11:31 96.2 65 17 154/95 95   


 


2/25/17 07:15  59      


 


2/25/17 07:05 95.7 59 17 163/98 96   


 


2/25/17 03:37 96.8 53 16 141/75 95   


 


2/25/17 02:13  54  157/82 98   


 


2/25/17 00:00 96.5 50 16 130/80 96   


 


2/24/17 23:50  58      


 


2/24/17 20:27 97.3 52 16 136/80 96   


 


2/24/17 18:48      Room Air  


 


2/24/17 18:47  54      


 


2/24/17 18:31     96   21








 I/O








 2/24/17 2/24/17 2/24/17 2/25/17 2/25/17 2/25/17





 07:00 15:00 23:00 07:00 15:00 23:00


 


Intake Total 240 ml 1080 ml 480 ml 449 ml 960 ml 


 


Output Total    200 ml  


 


Balance 240 ml 1080 ml 480 ml 249 ml 960 ml 


 


      


 


Intake Oral 240 ml 1080 ml 480 ml 240 ml 960 ml 


 


IV Total    209 ml  


 


Output Urine Total    200 ml  


 


# Voids 1 5 3 1 4 


 


# Bowel Movements 0  1 0 1 








Result Diagram:  


2/25/17 0450                                                                   

             2/25/17 0450





Imaging





Last Impressions








Tumor Localization 2/23/17 0000 Signed





Impressions: 





 Service Date/Time:  Thursday, February 23, 2017 15:59 - CONCLUSION:  1. 

Probable 





 right-sided dental disease. 2. Otherwise, unremarkable exam.     Evin Daily MD 


 


Gall Bladder Ultrasound 2/21/17 0228 Signed





Impressions: 





 Service Date/Time:  Tuesday, February 21, 2017 02:51 - CONCLUSION: Fatty 

liver.  





    López Peters MD 


 


Abdomen/Pelvis CT 2/21/17 0103 Signed





Impressions: 





 Service Date/Time:  Tuesday, February 21, 2017 01:51 - CONCLUSION:  1. 

Prostatic 





 enlargement with suspected very prominent compression on the bladder floor 





 creating a masslike area in the bladder floor. 2. Fatty infiltration liver. 3. 





 Nonobstructing left renal stone. 4. Bibasilar areas of consolidation or 





 atelectasis at the lung bases    López Peters MD 


 


Chest X-Ray 2/21/17 0018 Signed





Impressions: 





 Service Date/Time:  Tuesday, February 21, 2017 00:34 - CONCLUSION:  Expiratory 





 chest x-ray with suspected borderline cardiomegaly.     López Peters MD 








Objective Remarks


GENERAL:  65 yo male, well nourished, well developed patient, in not acute 

distress. 


SKIN: Warm and dry.


HEAD: Atraumatic. Normocephalic. 


EYES: Pupils equal and round. No scleral icterus. No injection or drainage. 


ENT: No nasal bleeding or discharge.  Mucous membranes pink and moist.


NECK: Trachea midline. No JVD. 


CARDIOVASCULAR: Regular rate and rhythm.  


RESPIRATORY: No accessory muscle use. Clear to auscultation. Breath sounds 

equal bilaterally. 


GASTROINTESTINAL: Abdomen soft, non-tender, nondistended. Hepatic and splenic 

margins not palpable. 


MUSCULOSKELETAL: Extremities without clubbing, cyanosis, or edema. No obvious 

deformities. 


NEUROLOGICAL: Awake and alert. No obvious cranial nerve deficits.  Motor 

grossly within normal limits. Five out of 5 muscle strength in the arms and 

legs.  Normal speech.


PSYCHIATRIC: Appropriate mood and affect; insight and judgment normal.


Procedures


None





A/P


Problem List:  


(1) Pneumonia


ICD Code:  J18.9


Status:  Acute


(2) Sepsis


ICD Code:  A41.9


Status:  Acute


(3) Elevated liver enzymes


ICD Code:  R74.8


Status:  Acute


(4) HTN (hypertension)


ICD Code:  I10


Status:  Chronic


(5) DM (diabetes mellitus)


ICD Code:  E11.9


Status:  Chronic


(6) Bacteremia


ICD Code:  R78.81


Status:  Acute


(7) Leukocytosis


ICD Code:  D72.829


Status:  Acute


Assessment and Plan


Abdominal pain: Uncertain etiology. Continue workup. Appreciate GI 

recommendations.


Elevated transaminases: Patient denies alcohol use. Ultrasound shows fatty 

liver. Monitor labs. Hepatitis panel is negative. LFTs are trending down. Poss 

acute 2/2 sepsis. 


Sepsis secondary to pneumonia/also bacteremia: Patient with fever, tachycardia 

on admission. Suspected source is pneumonia. CT showed areas of consolidation 

versus atelectasis in the lung bases. 


Bacteremia: Blood culture growing GNR Klebsiella pneumoniae and Enterobacter 

species. Repeat Blood cultures 2/22/17 NTD. Monitor unril neg Blood cx.  

Continue antibiotics. Infectious disease specialist consulted and following. 


ID following appreciate recommendations. Patient went for RBC scan to evaluate 

for occult infection.  1. Probable  right-sided dental disease. 2. Otherwise, 

unremarkable exam.


Discussed with the patient and family ( daughter at bedside) to follow up with 

the dentist. 


Hypertension: Fairly controlled Continue atenolol, amlodipine. Monitor VS and 

adjust meds if need. Vasotec IV prn if high BP


GREGG. Continue IVF. Monitor Kidney indices. Avoid nephrotoxics. UA neg. Pt also 

has BPH. 


Diabetes mellitus type 2, controlled A1c 5.3 9/16. Monitor Accu-Cheks and cover 

with sliding scale insulin. Continue 70/30 insulin. Hold Janumet secondary to 

renal insufficiency. Continue Invokana, pioglitazone.


BPH: Continue Flomax, Proscar. Follow up with Dr Comer as OP. Has an 

appointment in April. 








DVT prophylaxis: Heparin.


Discussed with patient, nurse, family








Shea Villarreal MD Feb 25, 2017 17:31

## 2017-02-26 VITALS — HEART RATE: 53 BPM

## 2017-02-26 VITALS
SYSTOLIC BLOOD PRESSURE: 141 MMHG | OXYGEN SATURATION: 98 % | HEART RATE: 57 BPM | RESPIRATION RATE: 18 BRPM | TEMPERATURE: 96.6 F | DIASTOLIC BLOOD PRESSURE: 76 MMHG

## 2017-02-26 VITALS
OXYGEN SATURATION: 96 % | SYSTOLIC BLOOD PRESSURE: 136 MMHG | RESPIRATION RATE: 18 BRPM | HEART RATE: 59 BPM | TEMPERATURE: 96.8 F | DIASTOLIC BLOOD PRESSURE: 77 MMHG

## 2017-02-26 VITALS
HEART RATE: 57 BPM | OXYGEN SATURATION: 94 % | SYSTOLIC BLOOD PRESSURE: 153 MMHG | TEMPERATURE: 95.7 F | DIASTOLIC BLOOD PRESSURE: 93 MMHG | RESPIRATION RATE: 17 BRPM

## 2017-02-26 RX ADMIN — ASPIRIN SCH MG: 81 TABLET ORAL at 09:23

## 2017-02-26 RX ADMIN — HEPARIN SODIUM SCH UNITS: 10000 INJECTION, SOLUTION INTRAVENOUS; SUBCUTANEOUS at 09:23

## 2017-02-26 RX ADMIN — SODIUM CHLORIDE, PRESERVATIVE FREE SCH ML: 5 INJECTION INTRAVENOUS at 09:31

## 2017-02-26 RX ADMIN — PIOGLITAZONE SCH MG: 45 TABLET ORAL at 09:22

## 2017-02-26 RX ADMIN — DICYCLOMINE HYDROCHLORIDE SCH MG: 20 TABLET ORAL at 09:23

## 2017-02-26 RX ADMIN — INSULIN ASPART SCH: 100 INJECTION, SOLUTION INTRAVENOUS; SUBCUTANEOUS at 06:22

## 2017-02-26 RX ADMIN — HUMAN INSULIN SCH UNITS: 100 INJECTION, SUSPENSION SUBCUTANEOUS at 09:24

## 2017-02-26 RX ADMIN — TAZOBACTAM SODIUM AND PIPERACILLIN SODIUM SCH MLS/HR: 375; 3 INJECTION, SOLUTION INTRAVENOUS at 09:37

## 2017-02-26 RX ADMIN — TAZOBACTAM SODIUM AND PIPERACILLIN SODIUM SCH MLS/HR: 375; 3 INJECTION, SOLUTION INTRAVENOUS at 03:15

## 2017-02-26 RX ADMIN — ATENOLOL SCH MG: 25 TABLET ORAL at 09:23

## 2017-02-26 RX ADMIN — PANTOPRAZOLE SCH MG: 40 TABLET, DELAYED RELEASE ORAL at 09:22

## 2017-02-26 NOTE — HHI.DS
__________________________________________________





Discharge Summary


Admission Date


Feb 21, 2017 at 05:04


Discharge Date:  Feb 26, 2017


Admitting Diagnosis


pneumonia/elevated liver enzymes





(1) Pneumonia


ICD Code:  J18.9


Diagnosis:  Principal





(2) Sepsis


ICD Code:  A41.9


Diagnosis:  Principal





(3) Elevated liver enzymes


ICD Code:  R74.8


Diagnosis:  Principal





(4) HTN (hypertension)


ICD Code:  I10


Diagnosis:  Secondary





(5) DM (diabetes mellitus)


ICD Code:  E11.9


Diagnosis:  Secondary





(6) Bacteremia


ICD Code:  R78.81


Diagnosis:  Secondary





(7) Leukocytosis


ICD Code:  D72.829


Diagnosis:  Secondary





Procedures


None


Brief History - From Admission


The patient is a 66-year-old male who presented to the emergency department 

with a 2 day history of abdominal pain, body aches, and chills. These 

progressively got worse yesterday. He reports subjective fever, but no chills 

or night sweats. Denies cough or dyspnea. Abdominal pain is in the 

midepigastric region. No nausea or vomiting. No diarrhea or constipation.


CBC/BMP:  


2/25/17 0450                                                                   

             2/25/17 0450





Significant Findings





Laboratory Tests








Test 2/25/17





 04:50


 


Monocytes (%) (Auto) 10.3 %





 (0.0-8.0)


 


Monocytes # (Auto) 1.0 TH/MM3





 (0-0.9)


 


Creatinine 1.31 MG/DL





 (0.60-1.30)


 


Estimat Glomerular Filtration 67 ML/MIN (>89)





Rate 


 


Random Glucose 171 MG/DL





 ()


 


Aspartate Amino Transf 65 U/L (15-37)





(AST/SGOT) 


 


Alanine Aminotransferase 175 U/L (12-78)





(ALT/SGPT) 


 


Alkaline Phosphatase 129 U/L





 ()


 


Albumin 3.0 GM/DL





 (3.4-5.0)








Imaging





Last Impressions








Tumor Localization 2/23/17 0000 Signed





Impressions: 





 Service Date/Time:  Thursday, February 23, 2017 15:59 - CONCLUSION:  1. 

Probable 





 right-sided dental disease. 2. Otherwise, unremarkable exam.     Evin Daily MD 


 


Gall Bladder Ultrasound 2/21/17 0228 Signed





Impressions: 





 Service Date/Time:  Tuesday, February 21, 2017 02:51 - CONCLUSION: Fatty 

liver.  





    López Peters MD 


 


Abdomen/Pelvis CT 2/21/17 0103 Signed





Impressions: 





 Service Date/Time:  Tuesday, February 21, 2017 01:51 - CONCLUSION:  1. 

Prostatic 





 enlargement with suspected very prominent compression on the bladder floor 





 creating a masslike area in the bladder floor. 2. Fatty infiltration liver. 3. 





 Nonobstructing left renal stone. 4. Bibasilar areas of consolidation or 





 atelectasis at the lung bases    López Peters MD 


 


Chest X-Ray 2/21/17 0018 Signed





Impressions: 





 Service Date/Time:  Tuesday, February 21, 2017 00:34 - CONCLUSION:  Expiratory 





 chest x-ray with suspected borderline cardiomegaly.     López Peters MD 








PE at Discharge


GENERAL:  65 yo male, well nourished, well developed patient, in not acute 

distress. 


SKIN: Warm and dry.


HEAD: Atraumatic. Normocephalic. 


EYES: Pupils equal and round. No scleral icterus. No injection or drainage. 


ENT: No nasal bleeding or discharge.  Mucous membranes pink and moist.


NECK: Trachea midline. No JVD. 


CARDIOVASCULAR: Regular rate and rhythm.  


RESPIRATORY: No accessory muscle use. Clear to auscultation. Breath sounds 

equal bilaterally. 


GASTROINTESTINAL: Abdomen soft, non-tender, nondistended. Hepatic and splenic 

margins not palpable. 


MUSCULOSKELETAL: Extremities without clubbing, cyanosis, or edema. No obvious 

deformities. 


NEUROLOGICAL: Awake and alert. No obvious cranial nerve deficits.  Motor 

grossly within normal limits. Five out of 5 muscle strength in the arms and 

legs.  Normal speech.


PSYCHIATRIC: Appropriate mood and affect; insight and judgment normal.


Pt update on day of discharge


Feesl much better today/ No fever r chills. Feels  comfortable to go home. No 

sob, no n/v/d/c. Eating  better. Cleared by ID specialist for DC


Hospital Course


Abdominal pain: Uncertain etiology. Continue workup. Appreciate GI 

recommendations.


Elevated transaminases: Patient denies alcohol use. Ultrasound shows fatty 

liver. Monitor labs. Hepatitis panel is negative. LFTs are trending down. Poss 

acute 2/2 sepsis. 


Sepsis secondary to pneumonia/also bacteremia: Patient with fever, tachycardia 

on admission. Suspected source is pneumonia. CT showed areas of consolidation 

versus atelectasis in the lung bases. 


Bacteremia: Blood culture growing GNR Klebsiella pneumoniae and Enterobacter 

species. Repeat Blood cultures 2/22/17 NTD. Monitor unril neg Blood cx.  

Continue antibiotics. Infectious disease specialist consulted and following. 


ID following appreciate recommendations. Patient went for RBC scan to evaluate 

for occult infection.  1. Probable  right-sided dental disease. 2. Otherwise, 

unremarkable exam.


Discussed with the patient and family ( daughter at bedside) to follow up with 

the dentist. 


Switch abx to PO levaquin per infectious disease specialist, to continue at 

discharge. 


Hypertension: Fairly controlled Continue atenolol, amlodipine. Monitor VS and 

adjust meds if need. Vasotec IV prn if high BP


GREGG. Continue IVF. Monitor Kidney indices. Avoid nephrotoxics. UA neg. Pt also 

has BPH. 


Diabetes mellitus type 2, controlled A1c 5.3 9/16. Monitor Accu-Cheks and cover 

with sliding scale insulin. Continue 70/30 insulin. Hold Janumet secondary to 

renal insufficiency. Continue Invokana, pioglitazone.


BPH: Continue Flomax, Proscar. Follow up with Dr Comer as OP. Has an 

appointment in April. 








DVT prophylaxis: Heparin.


Discussed with patient, nurse, family





Improved, cleared for DC by consultants. Patient was DC home in fairly good 

condition, to follow up as OP with PCP and consultants.


Pt Condition on Discharge:  Stable


Discharge Disposition:  Discharge Home


Discharge Time:  <= 30 minutes


Discharge Instructions


DIET: Follow Instructions for:  Heart Healthy Diet


Activities you can perform:  Regular-No Restrictions


Follow up Referrals:  


PCP Follow-up - 3-5 Days





New Medications:  


Atenolol (Atenolol) 25 Mg Tab


25 MG PO DAILY Blood Pressure Management #30 TAB


Levofloxacin (Levaquin) 750 Mg Tab


750 MG PO Q24H infection #14 TAB


 


Continued Medications:  


Amlodipine (Norvasc) 10 Mg Tab


10 MG PO DAILY Blood Pressure Management #30 Ref 0 TAB


Aspirin (Aspirin) 81 Mg Tabdr


81 MG PO DAILY TAB


Canagliflozin (Invokana) 100 Mg Tab


100 MG PO DAILY Take before 1st meal of day. Blood Sugar Management #30 Ref 0 

TAB


Dicyclomine (Bentyl) 20 Mg Tab


20 MG PO TID Bowel Management Ref 0 TAB


Finasteride (Proscar) 5 Mg Tab


5 MG PO HS Do not crush. Manage Prostate Problems #30 Ref 0 TAB


Ibuprofen (Ibuprofen) 600 Mg Tab


600 MG PO DAILY PRN Pain/Inflammation #40 Ref 0 TAB


Insulin Human Isophane-Regular 70-30 Inj (Novolin 70/30 Inj) 1,000 Units/10 Ml 

Inj


5 UNITS SQ BID


Pioglitazone (Pioglitazone) 45 Mg Tab


45 MG PO DAILY Blood Sugar Management #30 Ref 0 TAB


Sitagliptin-Metformin (Janumet) 50-1,000 Mg Tab


1 TAB PO DAILY Blood Sugar Management #60 Ref 0 TAB


Tamsulosin (Flomax) 0.4 Mg Cap


0.4 MG PO HS Manage Prostate Problems #30 Ref 0 CAP


 


Discontinued Medications:  


Atenolol (Atenolol) 50 Mg Tab


50 MG PO DAILY Blood Pressure Management #30 Ref 0 TAB











Shea Villarreal MD Feb 26, 2017 07:07

## 2018-06-24 ENCOUNTER — HOSPITAL ENCOUNTER (OUTPATIENT)
Dept: HOSPITAL 17 - NEPC | Age: 68
Setting detail: OBSERVATION
LOS: 1 days | Discharge: HOME | End: 2018-06-25
Payer: MEDICARE

## 2018-06-24 VITALS — HEIGHT: 68 IN | BODY MASS INDEX: 27.4 KG/M2 | WEIGHT: 180.78 LBS

## 2018-06-24 VITALS
SYSTOLIC BLOOD PRESSURE: 152 MMHG | TEMPERATURE: 98.2 F | RESPIRATION RATE: 20 BRPM | DIASTOLIC BLOOD PRESSURE: 94 MMHG | OXYGEN SATURATION: 98 % | HEART RATE: 82 BPM

## 2018-06-24 VITALS
RESPIRATION RATE: 18 BRPM | TEMPERATURE: 98.9 F | DIASTOLIC BLOOD PRESSURE: 101 MMHG | OXYGEN SATURATION: 98 % | SYSTOLIC BLOOD PRESSURE: 159 MMHG | HEART RATE: 86 BPM

## 2018-06-24 DIAGNOSIS — E78.00: ICD-10-CM

## 2018-06-24 DIAGNOSIS — M79.604: ICD-10-CM

## 2018-06-24 DIAGNOSIS — N40.0: ICD-10-CM

## 2018-06-24 DIAGNOSIS — I10: ICD-10-CM

## 2018-06-24 DIAGNOSIS — Z79.899: ICD-10-CM

## 2018-06-24 DIAGNOSIS — R42: ICD-10-CM

## 2018-06-24 DIAGNOSIS — Z79.4: ICD-10-CM

## 2018-06-24 DIAGNOSIS — R94.31: ICD-10-CM

## 2018-06-24 DIAGNOSIS — R29.898: ICD-10-CM

## 2018-06-24 DIAGNOSIS — K21.9: ICD-10-CM

## 2018-06-24 DIAGNOSIS — I25.10: ICD-10-CM

## 2018-06-24 DIAGNOSIS — R53.1: ICD-10-CM

## 2018-06-24 DIAGNOSIS — N28.9: ICD-10-CM

## 2018-06-24 DIAGNOSIS — Z86.73: ICD-10-CM

## 2018-06-24 DIAGNOSIS — R06.02: ICD-10-CM

## 2018-06-24 DIAGNOSIS — E11.9: ICD-10-CM

## 2018-06-24 DIAGNOSIS — I25.2: ICD-10-CM

## 2018-06-24 DIAGNOSIS — M79.605: ICD-10-CM

## 2018-06-24 DIAGNOSIS — R07.9: Primary | ICD-10-CM

## 2018-06-24 DIAGNOSIS — Z79.01: ICD-10-CM

## 2018-06-24 PROCEDURE — 96360 HYDRATION IV INFUSION INIT: CPT

## 2018-06-24 PROCEDURE — 83880 ASSAY OF NATRIURETIC PEPTIDE: CPT

## 2018-06-24 PROCEDURE — 99285 EMERGENCY DEPT VISIT HI MDM: CPT

## 2018-06-24 PROCEDURE — 85610 PROTHROMBIN TIME: CPT

## 2018-06-24 PROCEDURE — 71046 X-RAY EXAM CHEST 2 VIEWS: CPT

## 2018-06-24 PROCEDURE — 93005 ELECTROCARDIOGRAM TRACING: CPT

## 2018-06-24 PROCEDURE — G0378 HOSPITAL OBSERVATION PER HR: HCPCS

## 2018-06-24 PROCEDURE — 83690 ASSAY OF LIPASE: CPT

## 2018-06-24 PROCEDURE — 96372 THER/PROPH/DIAG INJ SC/IM: CPT

## 2018-06-24 PROCEDURE — 82948 REAGENT STRIP/BLOOD GLUCOSE: CPT

## 2018-06-24 PROCEDURE — 82552 ASSAY OF CPK IN BLOOD: CPT

## 2018-06-24 PROCEDURE — 80053 COMPREHEN METABOLIC PANEL: CPT

## 2018-06-24 PROCEDURE — 82550 ASSAY OF CK (CPK): CPT

## 2018-06-24 PROCEDURE — 93017 CV STRESS TEST TRACING ONLY: CPT

## 2018-06-24 PROCEDURE — 96361 HYDRATE IV INFUSION ADD-ON: CPT

## 2018-06-24 PROCEDURE — 85025 COMPLETE CBC W/AUTO DIFF WBC: CPT

## 2018-06-24 PROCEDURE — A9502 TC99M TETROFOSMIN: HCPCS

## 2018-06-24 PROCEDURE — 84484 ASSAY OF TROPONIN QUANT: CPT

## 2018-06-24 PROCEDURE — 78452 HT MUSCLE IMAGE SPECT MULT: CPT

## 2018-06-24 PROCEDURE — 83735 ASSAY OF MAGNESIUM: CPT

## 2018-06-24 PROCEDURE — 85730 THROMBOPLASTIN TIME PARTIAL: CPT

## 2018-06-25 VITALS
OXYGEN SATURATION: 99 % | RESPIRATION RATE: 16 BRPM | TEMPERATURE: 97.5 F | HEART RATE: 45 BPM | DIASTOLIC BLOOD PRESSURE: 71 MMHG | SYSTOLIC BLOOD PRESSURE: 115 MMHG

## 2018-06-25 VITALS
HEART RATE: 70 BPM | OXYGEN SATURATION: 98 % | TEMPERATURE: 98 F | RESPIRATION RATE: 16 BRPM | DIASTOLIC BLOOD PRESSURE: 85 MMHG | SYSTOLIC BLOOD PRESSURE: 131 MMHG

## 2018-06-25 VITALS
HEART RATE: 61 BPM | DIASTOLIC BLOOD PRESSURE: 72 MMHG | OXYGEN SATURATION: 97 % | RESPIRATION RATE: 16 BRPM | TEMPERATURE: 97.6 F | SYSTOLIC BLOOD PRESSURE: 111 MMHG

## 2018-06-25 VITALS
HEART RATE: 80 BPM | DIASTOLIC BLOOD PRESSURE: 90 MMHG | RESPIRATION RATE: 20 BRPM | OXYGEN SATURATION: 98 % | SYSTOLIC BLOOD PRESSURE: 150 MMHG

## 2018-06-25 VITALS — RESPIRATION RATE: 20 BRPM

## 2018-06-25 VITALS — HEART RATE: 57 BPM

## 2018-06-25 VITALS — HEART RATE: 67 BPM

## 2018-06-25 VITALS — OXYGEN SATURATION: 98 %

## 2018-06-25 VITALS — OXYGEN SATURATION: 95 %

## 2018-06-25 LAB
ALBUMIN SERPL-MCNC: 4.2 GM/DL (ref 3.4–5)
ALP SERPL-CCNC: 145 U/L (ref 45–117)
ALT SERPL-CCNC: 31 U/L (ref 12–78)
AST SERPL-CCNC: 35 U/L (ref 15–37)
BASOPHILS # BLD AUTO: 0.1 TH/MM3 (ref 0–0.2)
BASOPHILS NFR BLD: 0.9 % (ref 0–2)
BILIRUB SERPL-MCNC: 0.8 MG/DL (ref 0.2–1)
BUN SERPL-MCNC: 27 MG/DL (ref 7–18)
CALCIUM SERPL-MCNC: 9.3 MG/DL (ref 8.5–10.1)
CHLORIDE SERPL-SCNC: 98 MEQ/L (ref 98–107)
CREAT SERPL-MCNC: 1.56 MG/DL (ref 0.6–1.3)
EOSINOPHIL # BLD: 0.1 TH/MM3 (ref 0–0.4)
EOSINOPHIL NFR BLD: 1.3 % (ref 0–4)
ERYTHROCYTE [DISTWIDTH] IN BLOOD BY AUTOMATED COUNT: 13 % (ref 11.6–17.2)
GFR SERPLBLD BASED ON 1.73 SQ M-ARVRAT: 54 ML/MIN (ref 89–?)
GLUCOSE SERPL-MCNC: 249 MG/DL (ref 74–106)
HCO3 BLD-SCNC: 26.3 MEQ/L (ref 21–32)
HCT VFR BLD CALC: 43.5 % (ref 39–51)
HGB BLD-MCNC: 14.3 GM/DL (ref 13–17)
INR PPP: 1 RATIO
LYMPHOCYTES # BLD AUTO: 3 TH/MM3 (ref 1–4.8)
LYMPHOCYTES NFR BLD AUTO: 29.7 % (ref 9–44)
MAGNESIUM SERPL-MCNC: 2.2 MG/DL (ref 1.5–2.5)
MCH RBC QN AUTO: 28.2 PG (ref 27–34)
MCHC RBC AUTO-ENTMCNC: 32.9 % (ref 32–36)
MCV RBC AUTO: 85.9 FL (ref 80–100)
MONOCYTE #: 0.9 TH/MM3 (ref 0–0.9)
MONOCYTES NFR BLD: 9.3 % (ref 0–8)
NEUTROPHILS # BLD AUTO: 6 TH/MM3 (ref 1.8–7.7)
NEUTROPHILS NFR BLD AUTO: 58.8 % (ref 16–70)
PLATELET # BLD: 198 TH/MM3 (ref 150–450)
PMV BLD AUTO: 10.6 FL (ref 7–11)
PROT SERPL-MCNC: 9 GM/DL (ref 6.4–8.2)
PROTHROMBIN TIME: 10.1 SEC (ref 9.8–11.6)
RBC # BLD AUTO: 5.06 MIL/MM3 (ref 4.5–5.9)
SODIUM SERPL-SCNC: 134 MEQ/L (ref 136–145)
TROPONIN I SERPL-MCNC: (no result) NG/ML (ref 0.02–0.05)
WBC # BLD AUTO: 10.2 TH/MM3 (ref 4–11)

## 2018-06-25 NOTE — HHI.DCPOC
Discharge Care Plan


Diagnosis:  


(1) DM (diabetes mellitus)


(2) Atypical chest pain


(3) Weakness of both legs


Goals to Promote Your Health


* To prevent worsening of your condition and complications


* To maintain your health at the optimal level


Directions to Meet Your Goals


*** Take your medications as prescribed


*** Follow your dietary instruction


*** Follow activity as directed








*** Keep your appointments as scheduled


*** Take your immunizations and boosters as scheduled


*** If your symptoms worsen call your PCP, if no PCP go to Urgent Care Center 

or Emergency Room***


*** Smoking is Dangerous to Your Health. Avoid second hand smoke***


***Call the 24-hour hour crisis hotline for domestic abuse at 1-652.492.1572***











Suzan Mejia Jun 25, 2018 13:07

## 2018-06-25 NOTE — PD
HPI


Chief Complaint:  Chest Pain


Time Seen by Provider:  23:56


Travel History


International Travel<30 days:  No


Contact w/Intl Traveler<30days:  No


Traveled to known affect area:  No





History of Present Illness


HPI


The patient is a 68 year old male who presents to the [facility] emergency 

department with a history of reportedly not feeling well for the last 4 days.  

He reports that it began with bilateral leg pain below his knees that occurs 

with walking.  He reports that his legs also feel like they are going to give 

out.  He reports that he felt lightheaded.  The patient reports that in the 

night yesterday he awoke from sound sleep with chest pain.  The patient reports 

that the pain is in the center of his chest and radiates out bilaterally and 

also into his left shoulder and left arm.  He reports having shortness of 

breath associated with this.  He denies having any nausea or vomiting.  He 

denies having any diaphoresis.  He denies having any diarrhea.  The patient 

reports that the pain is a tightening sensation.  He reports that it comes and 

goes.  He reports that the pain is worse with lying flat.  He reports a prior 

history of myocardial infarction.  He cannot recall when he last had a stress 

test done.  He cannot recall the name of his cardiologist.  He reports that his 

primary care physician is Dr. Porras.  He denies taking aspirin daily.  On 

review of systems otherwise, the patient denies having any known recent fevers, 

headache, neck pain, cough or congestion, abdominal pain, urinary symptoms, or 

neurologic symptoms.





[facility]


Past Medical History


*** Narrative Medical


The patient's past medical history is significant for hypertension, history of 

TIA, acid reflux, benign prostatic hypertrophy, prior history of myocardial 

infarction reportedly in 1998, diabetes mellitus.


Hx Anticoagulant Therapy:  Yes (ASA)


Arthritis:  No


Asthma:  No


Autoimmune Disease:  No


Blood Disorders:  No


Anxiety:  No


Depression:  No


Heart Rhythm Problems:  No


Cancer:  No


Cardiac Catheterization:  Yes (04/2010)


Cardiovascular Problems:  Yes (HTN)


High Cholesterol:  Yes


Chemotherapy:  No


Chest Pain:  Yes


Congestive Heart Failure:  No


COPD:  No


Cerebrovascular Accident:  Yes (TIA X3)


Diabetes:  Yes


Patient Takes Glucophage:  No


Diminished Hearing:  No


Endocrine:  Yes


Gastrointestinal Disorders:  Yes (HEMMRHOIDS, ACID REFLEX)


GERD:  Yes


Glaucoma:  No


Genitourinary:  Yes (ENLARGED PROSTATE)


Headaches:  No


Hepatitis:  No


Hiatal Hernia:  No


Hypertension:  Yes


Immune Disorder:  No


Implanted Vascular Access Dvce:  No


Kidney Stones:  No


Medical other:  No


Musculoskeletal:  Yes (NECK PROBLEMS)


Neurologic:  Yes (HX OF 3 MINI STROKES -- 2008)


Psychiatric:  No


Reproductive:  No


Respiratory:  Yes


Immunizations Current:  Yes


Migraines:  No


Myocardial Infarction:  Yes (04/2010)


Radiation Therapy:  No


Renal Failure:  No


Seizures:  No


Sleep Apnea:  No


Thyroid Disease:  No


Ulcer:  No


Tetanus Vaccination:  > 5 Years


Influenza Vaccination:  Yes





Past Surgical History


*** Narrative Surgical


The patient reports a prior history of cardiac catheterization, however he is 

unsure of whether he had stenting or angioplasty, history of cystoscopy, dental 

surgery.


Abdominal Surgery:  No


AICD:  No


Appendectomy:  No


Arteriovenous Shunt:  No


Cardiac Surgery:  Yes (HEART CATH STENT 2014)


Cholecystectomy:  No


Ear Surgery:  No


Endocrine Surgery:  No


Eye Surgery:  No


Genitourinary Surgery:  Yes (cystoscope)


Gynecologic Surgery:  No


Insulin Pump:  No


Joint Replacement:  No


Neurologic Surgery:  No


Oral Surgery:  Yes (DENTAL SURGERY IN 1970'S)


Pacemaker:  No


Thoracic Surgery:  No


Other Surgery:  Yes





Social History


Alcohol Use:  No


Tobacco Use:  No


Substance Use:  No





Allergies-Medications


(Allergen,Severity, Reaction):  


Coded Allergies:  


     shellfish derived (Unverified  Allergy, Severe, Anaphylaxis, 6/24/18)


Reported Meds & Prescriptions





Reported Meds & Active Scripts


Active


Atenolol 25 Mg Tab 25 Mg PO DAILY


Reported


Novolin 70/30 Inj (Insulin Human Isoph/Insulin Regular) 1,000 Units/10 Ml Inj 5 

Units SQ BID


Flomax (Tamsulosin HCl) 0.4 Mg Cap 0.4 Mg PO HS


Pioglitazone (Pioglitazone HCl) 45 Mg Tab 45 Mg PO DAILY


Janumet (Sitagliptin-Metformin) 50-1,000 Mg Tab 1 Tab PO DAILY


Proscar (Finasteride) 5 Mg Tab 5 Mg PO HS


     Do not crush.


Invokana (Canagliflozin) 100 Mg Tab 100 Mg PO DAILY


     Take before 1st meal of day.


Norvasc (Amlodipine Besylate) 10 Mg Tab 10 Mg PO DAILY








Review of Systems


Except as stated in HPI:  all other systems reviewed are Neg


General / Constitutional:  No: Fever


Eyes:  No: Visual changes


HENT:  Positive: Lightheadedness, No: Headaches, Congestion, Neck Stiffness, 

Neck Pain


Cardiovascular:  Positive: Chest Pain or Discomfort


Respiratory:  Positive: Shortness of Breath, No: Cough


Gastrointestinal:  No: Nausea, Vomiting, Diarrhea, Abdominal Pain


Genitourinary:  No: Dysuria


Musculoskeletal:  Positive: Myalgias, Pain


Skin:  No Rash


Neurologic:  Positive: Weakness, No: Focal Abnormalities, Headache, Change in 

Mentation, Slurred Speech, Sensory Disturbance


Psychiatric:  No: Depression


Endocrine:  No: Polydipsia


Hematologic/Lymphatic:  No: Easy Bruising





Physical Exam


Narrative


General: 


The patient is a well-developed well-nourished male in no acute distress. 





Head and Neck exam: 


Head is normocephalic atraumatic. 


Eyes: EOMI, pupils are equal round and reactive to light. 


Nose: Midline septum with pink mucous membranes 


Mouth: Dentition unremarkable. Moist mucus membranes. Posterior oropharynx is 

not erythematous. No tonsillar hypertrophy. Uvula midline. Airway patent. 


Neck: No palpable lymphadenopathy. No nuchal rigidity. No thyromegaly. 





Cardiovascular: 


Regular rate and rhythm without murmurs, gallops, or rubs. No pulse deficit to 

the extremities on simultaneous auscultation and palpation of his radial 

artery. 





Lungs: 


Clear to auscultation bilaterally. No wheezes, rhonchi, or rales.


 


Abdomen:


Soft, without tenderness to palpation in all 4 quadrants of the abdomen. No 

guarding, rebound, or rigidity.  Normal bowel sounds are audible.  No 

tenderness on palpation of McBurney's point.  Negative Conrad sign





Extremities: 


No clubbing, cyanosis, or edema. 2+ pulses in all 4 extremities.  No calf 

tenderness on palpation.





Back: 


No spinous process tenderness to palpation. No costovertebral angle tenderness 

to palpation. 





Neurologic Exam:


Cranial nerves 2-12 were intact on exam. Strength is 5/5 in all 4 extremities. 

No sensory deficits noted. 





Skin Exam: No rash noted. Intact skin that is warm and dry.





Data


Data


Last Documented VS





Vital Signs








  Date Time  Temp Pulse Resp B/P (MAP) Pulse Ox O2 Delivery O2 Flow Rate FiO2


 


6/25/18 00:02      Room Air  


 


6/25/18 00:02   20     


 


6/25/18 00:00  80   98   


 


6/24/18 23:57 98.2       








Orders





 Orders


Electrocardiogram (6/24/18 23:59)


B-Type Natriuretic Peptide (6/24/18 23:59)


Ckmb (Isoenzyme) Profile (6/24/18 23:59)


Complete Blood Count With Diff (6/24/18 23:59)


Comprehensive Metabolic Panel (6/24/18 23:59)


Magnesium (Mg) (6/24/18 23:59)


Prothrombin Time / Inr (Pt) (6/24/18 23:59)


Act Partial Throm Time (Ptt) (6/24/18 23:59)


Troponin I (6/24/18 23:59)


Lipase (6/24/18 23:59)


Ecg Monitoring (6/24/18 23:59)


Bilateral Bp Monitoring (6/24/18 23:59)


Iv Access Insert/Monitor (6/24/18 23:59)


Oximetry (6/24/18 23:59)


Oxygen Administration (6/24/18 23:59)


Nitroglycerin 2% Oint (Nitroglycerin 2% (6/25/18 00:00)


Sodium Chloride 0.9% Flush (Ns Flush) (6/25/18 00:00)


Nitroglycerin Sl (Nitrostat Sl) (6/25/18 00:00)


Chest, Pa & Lat (6/24/18 23:59)


Aspirin Chew (Aspirin Chew) (6/25/18 00:30)


CKMB (6/25/18 00:05)


CKMB% (6/25/18 00:05)


Activity Bed Rest With Brp (6/25/18 02:30)


Vital Signs (Adult) Q4H (6/25/18 02:30)


Cardiac Rhythm .As Directed (6/25/18 02:30)


Notify Dr: Other .PRN (6/25/18 02:30)


Notify Dr. Parameters (6/25/18 02:30)


Resp Oxygen Nasal Cannula (6/25/18 )


Diet Npo (6/25/18 Breakfast)


Ckmb (Isoenzyme) Profile (6/25/18 03:05)


Ckmb (Isoenzyme) Profile (6/25/18 06:05)


Troponin I (6/25/18 03:05)


Troponin I (6/25/18 06:05)


Electrocardiogram (6/25/18 03:05)


Electrocardiogram (6/25/18 06:05)


^ Obtain (6/25/18 02:30)


Sodium Chlor 0.9% 1000 Ml Inj (Ns 1000 M (6/25/18 02:30)


Sodium Chloride 0.9% Flush (Ns Flush) (6/25/18 02:30)


Sodium Chloride 0.9% Flush (Ns Flush) (6/25/18 09:00)


Acetaminophen (Tylenol) (6/25/18 02:30)


Famotidine (Pepcid) (6/25/18 09:00)


Cardiac Monitor / Telemetry NY.Q8H (6/25/18 02:30)


Admit Order (Ed Use Only) (6/25/18 02:32)


CKMB (6/25/18 03:22)


CKMB% (6/25/18 03:22)





Labs





Laboratory Tests








Test


  6/25/18


00:05


 


White Blood Count 10.2 TH/MM3 


 


Red Blood Count 5.06 MIL/MM3 


 


Hemoglobin 14.3 GM/DL 


 


Hematocrit 43.5 % 


 


Mean Corpuscular Volume 85.9 FL 


 


Mean Corpuscular Hemoglobin 28.2 PG 


 


Mean Corpuscular Hemoglobin


Concent 32.9 % 


 


 


Red Cell Distribution Width 13.0 % 


 


Platelet Count 198 TH/MM3 


 


Mean Platelet Volume 10.6 FL 


 


Neutrophils (%) (Auto) 58.8 % 


 


Lymphocytes (%) (Auto) 29.7 % 


 


Monocytes (%) (Auto) 9.3 % 


 


Eosinophils (%) (Auto) 1.3 % 


 


Basophils (%) (Auto) 0.9 % 


 


Neutrophils # (Auto) 6.0 TH/MM3 


 


Lymphocytes # (Auto) 3.0 TH/MM3 


 


Monocytes # (Auto) 0.9 TH/MM3 


 


Eosinophils # (Auto) 0.1 TH/MM3 


 


Basophils # (Auto) 0.1 TH/MM3 


 


CBC Comment DIFF FINAL 


 


Differential Comment  


 


Prothrombin Time 10.1 SEC 


 


Prothromb Time International


Ratio 1.0 RATIO 


 


 


Activated Partial


Thromboplast Time 25.0 SEC 


 


 


Blood Urea Nitrogen 27 MG/DL 


 


Creatinine 1.56 MG/DL 


 


Random Glucose 249 MG/DL 


 


Total Protein 9.0 GM/DL 


 


Albumin 4.2 GM/DL 


 


Calcium Level 9.3 MG/DL 


 


Magnesium Level 2.2 MG/DL 


 


Alkaline Phosphatase 145 U/L 


 


Aspartate Amino Transf


(AST/SGOT) 35 U/L 


 


 


Alanine Aminotransferase


(ALT/SGPT) 31 U/L 


 


 


Total Bilirubin 0.8 MG/DL 


 


Sodium Level 134 MEQ/L 


 


Potassium Level 4.2 MEQ/L 


 


Chloride Level 98 MEQ/L 


 


Carbon Dioxide Level 26.3 MEQ/L 


 


Anion Gap 10 MEQ/L 


 


Estimat Glomerular Filtration


Rate 54 ML/MIN 


 


 


Total Creatine Kinase 145 U/L 


 


Creatine Kinase MB 0.8 NG/ML 


 


Troponin I


  LESS THAN 0.02


NG/ML


 


B-Type Natriuretic Peptide 3 PG/ML 


 


Lipase 126 U/L 











MDM


Medical Decision Making


Medical Screen Exam Complete:  Yes


Emergency Medical Condition:  Yes


Medical Record Reviewed:  Yes


Differential Diagnosis


Acute coronary syndrome, versus aortic dissection, versus pulmonary embolism, 

versus


Narrative Course


During the course of the patient's emergency department visit, the patient's 

history, examination, and differential diagnosis were reviewed with the 

patient. The patient was placed on a cardiac monitor with oximetry and frequent 

blood pressure monitoring. The patient had  IV access obtained and blood work 

sent for analysis.  An EKG was done on arrival.  The patient's EKG reveals a 

sinus rhythm heart rate of 83, QRS duration is 111 ms, QTC is 377 ms.  No acute 

ST segment elevation is noted.  T waves are inverted in V1.





The patient was initially provided aspirin 324 mg p.o. 1, nitroglycerin 

sublingual 1, nitroglycerin 1 inch to the chest wall.





The patient's laboratory studies were reviewed and remarkable for 


6/25/18 00:05








Total Protein 9.0 H, Albumin 4.2, Calcium Level 9.3, Magnesium Level 2.2, 

Alkaline Phosphatase 145 H, Aspartate Amino Transf (AST/SGOT) 35, Alanine 

Aminotransferase (ALT/SGPT) 31, Total Bilirubin 0.8, initial set of cardiac 

enzymes are negative.  Lipase is within normal limits.











Radiology studies were reviewed and remarkable for 


Last Impressions








Chest X-Ray 6/24/18 [id number] Signed





Impressions: 





 CONCLUSION: 





 No acute cardiopulmonary process.





  





 





The patient will be admitted to the chest pain center for rule out serial 

cardiac enzyme protocol followed by consideration of stress testing.





The patient's results were discussed with the patient, including the plan of 

care. I explained that further testing and/ or monitoring is indicated based on 

the patient's history, examination, and/ or laboratory findings. Therefore, I 

recommended admission for additional evaluation. The patient expressed 

understanding and was agreeable with this plan. The patient was admitted to the 

hospital in stable condition and sent to a bed under the care of the chest pain 

center.





Diagnosis





 Primary Impression:  


 Chest pain, rule out acute myocardial infarction





Admitting Information


Admitting Physician Requests:  Shellie Bhat MD Jun 25, 2018 00:50

## 2018-06-25 NOTE — HHI.DS
Discharge Summary


Admission Date


Jun 25, 2018 at 02:33


Discharge Date:  Jun 25, 2018


Admitting Diagnosis





cp r/o ACS





Brief History


68-year-old male with history of type 2 diabetes and hypertension presents 

emergency room for further evaluation chest pain 4 days and bilateral lower 

leg extremity weakness with accompanying dizziness.  Admitted chest pain 

center.  Ruled out with recent EKGs and cardiac enzymes.  Seen and evaluated by 

Dr. Mitch Pierre.  Lexiscan completed essentially unremarkable with known 

global hypokinesis and reduced EF. Discussed with patient discussing with PCP 

starting on a statin and ACE inhibitor, unclear is patient is taking. RN 

reports patient walking unit, with cane, without any difficulties. Discharged 

home with follow up with PCP.


CBC/BMP:  


6/25/18 0005                                                                   

             6/25/18 0005





Significant Findings





Laboratory Tests








Test


  6/25/18


00:05 6/25/18


03:22 6/25/18


06:28


 


Monocytes (%) (Auto)


  9.3 %


(0.0-8.0) 


  


 


 


Blood Urea Nitrogen


  27 MG/DL


(7-18) 


  


 


 


Creatinine


  1.56 MG/DL


(0.60-1.30) 


  


 


 


Random Glucose


  249 MG/DL


() 


  


 


 


Total Protein


  9.0 GM/DL


(6.4-8.2) 


  


 


 


Alkaline Phosphatase


  145 U/L


() 


  


 


 


Sodium Level


  134 MEQ/L


(136-145) 


  


 


 


Estimat Glomerular Filtration


Rate 54 ML/MIN


(>89) 


  


 


 


Troponin I


  LESS THAN 0.02


NG/ML LESS THAN 0.02


NG/ML LESS THAN 0.02


NG/ML


 


Creatine Kinase MB


  


  


  LESS THAN 0.5


NG/ML








Pt Condition on Discharge:  Good


Discharge Disposition:  Discharge Home


Discharge Instructions


DIET: Follow Instructions for:  Heart Healthy Diet, Diabetic Diet


Activities you can perform:  Regular-No Restrictions











Suzan Mejia Jun 25, 2018 13:12

## 2018-06-25 NOTE — RADRPT
EXAM DATE:  6/25/2018 12:17 AM EDT

AGE/SEX:        68 years / Male



INDICATIONS:  Chest pain. Vertigo.



CLINICAL DATA:  This is the patient's initial encounter. Patient reports that signs and symptoms have
 been present for 1 day and indicates a pain score of 4/10. 

                                                                          

MEDICAL/SURGICAL HISTORY:       None. None.



COMPARISON:      No prior exams available for comparison. 





FINDINGS:  

PA and lateral views of the chest demonstrate the lungs to be symmetrically aerated without evidence 
of mass, infiltrate or effusion. The cardiomediastinal contours are unremarkable. Osseous structures 
are intact. Surgical clips are seen in the upper abdomen



CONCLUSION: 

No acute cardiopulmonary process.



Electronically signed by: López Peters MD  6/25/2018 12:26 AM EDT

## 2018-06-25 NOTE — RADRPT
EXAM DATE:  6/25/2018 12:06 PM EDT

AGE/SEX:        68 years / Male



INDICATIONS:Angina. Myocardial infarction Mid chest pain with shortness of breath for one day.   

 

CLINICAL DATA:  This is the patient's initial encounter. Patient reports that signs and symptoms have
 been present for 1 day and indicates a pain score of 6/10. 

                                                                          

MEDICAL/SURGICAL HISTORY:       Diabetes mellitus type II.  Hypertension.  Stroke. Coronary artery st
ent.



COMPARISON:      No prior exams available for comparison. 

No external comparison.

 

DOSE:  8.1 mCi Tc 99m Myoview at rest

              26.8  mCi Tc99m-Myoview at stress

              0.4 mg Lexiscan



STRESS SYMPTOMS: Short of breath.







EJECTION FRACTION:  47 %



TECHNIQUE:  The patient underwent pharmacologic stress with infusion of prescribed dose.  Continuous 
ECG tracing was monitored during stress.  Gated SPECT imaging was performed after stress and conventi
onal SPECT imaging was performed at rest.  The examination was performed on a SPECT/CT scanner, both 
attenuation and non-corrected datasets were reviewed.



FINDINGS:  

Distribution:  The maximum perfused segment at stress is in the septal and lateral wall.

Perfusion Study:   The pattern of perfusion at stress is within normal limits.   

Gated Study:  There are intact wall motion and wall thickening with mild hypokinesis. The ejection fr
action is calculated at 47%.  



RISK CATEGORY:  Intermediate (1-3 % Annual Mortality Rate) 



CONCLUSION: 

1.  Mild global hypokinesis with ejection fraction 47%.

2.  No definite reversibility to suggest ischemia.



Electronically signed by: Gianni Delgado MD  6/25/2018 12:23 PM EDT

## 2018-06-25 NOTE — HHI.HP
[facility]


Primary Care Physician


López Porras MD


Chief Complaint


Chest pain and weakness


History of Present Illness


68-year-old male with history of type 2 diabetes and hypertension presents 

emergency room for further evaluation chest pain and weakness. Chest pain onset 

Friday morning. Location substernal. Characterized as sharp and dull achy pain. 

Radiation to left shoulder and left anterior chest. Laying on left side makes 

pain worse. Laying flat makes pain better. Movement of left arm makes pain 

worse. Hurts to take a deep breath. Duration constant, waxing and waning in 

intensity but denies discomfort ever completely resolving. No nausea, vomiting, 

dyspnea, or diaphoresis.  Reports similar pain in the past when he had a "

slight MI."  x2 cardiac catheterization reports indicate mild coronary artery 

disease. Unsure events over reported MI in . Continues to have chest 

discomfort as stated above. In regards to weakness, onset 3-4 days. Upon 

standing becomes dizzy and feels like this bilateral lower legs will "give out.

" Has been "taking it easy" due to reported symptoms and tried to "hide" 

symptoms from his daughter. Last evening, while shopping, he required "pushing 

the buggy" for bilateral leg weakness. No falling, back pain, LOC, fever, 

recent illness, saddle anesthesia, or similar episodes of bilateral leg 

weakness or dizziness. Denies any recent change in medications. States normal 

diabetes well controlled but over the weekend glucose reported to be elevated 

at 304, which in unusual for him.





Review of Systems


General: No fatigue, fever, chills, recent illness, or change in appetite.  

Other than as stated above his general state of health.


HEENT: No HA, no vision changes, no nasal congestion or drainage, no dysphasia


CV: Continues to have chest pain as stated above.  No palpitations. 


RESP: No SOB, cough, wheeze, recent respiratory infection, or history of COPD 

or asthma.


GI: No nausea, vomiting, bowel changes, diarrhea, constipation, pain, distention

, melena, or blood in the stool.  No unintentional weight gain or weight loss.


: No dysuria, urgency, frequency.  Known BPH.


EXT: No lower leg edema. Bilateral lower extremities "slight tingling, " No 

numbness. No past history of diabetic neuropathy.


MS: As stated above.  No recent injury or change in ROM.  History of cervical 

radiculopathy. 


NEURO: No current dizziness. Dizziness mostly occurs with position changes and/

or walking. Made worse when looking "straight up, straight down, or when I 

tired my head fast." No change in memory, LOC, or motor/sensory deficits


PSYCH: No anxiety, depression, or suicidal ideation


SKIN: No rashes, no concerning lesions





Past Family Social History


Allergies:  


Coded Allergies:  


     shellfish derived (Unverified  Allergy, Severe, Anaphylaxis, 18)


Past Medical History


Type 2 diabetes, hypertension, GERD, BPH


Past Surgical History


Dental surgery


Reported Medications





Reported Meds & Active Scripts


Active (Patient poor historian. Discussed with both patient and daughter, both 

unsure if patient ever or currently taking a statin or ACE inhibitor)


Atenolol 25 Mg Tab 25 Mg PO DAILY


Novolin 70/30 Inj (Insulin Human Isoph/Insulin Regular) 1,000 Units/10 Ml Inj 5 

Units SQ BID


Flomax (Tamsulosin HCl) 0.4 Mg Cap 0.4 Mg PO HS


Pioglitazone (Pioglitazone HCl) 45 Mg Tab 45 Mg PO DAILY


Janumet (Sitagliptin-Metformin) 50-1,000 Mg Tab 1 Tab PO DAILY


Proscar (Finasteride) 5 Mg Tab 5 Mg PO HS


     Do not crush.


Invokana (Canagliflozin) 100 Mg Tab 100 Mg PO DAILY


     Take before 1st meal of day.


Norvasc (Amlodipine Besylate) 10 Mg Tab 10 Mg PO DAILY


Active Ordered Medications





Current Medications








 Medications


  (Trade)  Dose


 Ordered  Sig/Ashlie


 Route  Start Time


 Stop Time Status Last Admin


 


  (NS Flush)  2 ml  UNSCH  PRN


 IVF  18 00:00


    18 00:47


 


 


 Sodium Chloride  1,000 ml @ 


 100 mls/hr  Q10H


 IV  18 02:30


    18 05:11


 


 


  (NS Flush)  2 ml  UNSCH  PRN


 IV FLUSH  18 02:30


     


 


 


  (NS Flush)  2 ml  BID


 IV FLUSH  18 09:00


     


 


 


  (Tylenol)  500 mg  Q4H  PRN


 PO  18 02:30


     


 


 


  (Pepcid)  20 mg  BID


 PO  18 09:00


     


 








Social History


No known coronary artery disease or hyperlipidemia.  Known type 2 diabetes and 

hypertension.


Denies smoking history. 


. Retired. Reports normally being quite active.





Past cardiac testing


5/15/15 Cardiac catheterization (Dr. Klein) Conclusions- Mild nonobstructive 

coronary artery disease.  Normal left sided filling.


5/4/15 Lexiscan-mild redistribution and lateral wall.  Global hypokinesis.  EF 

46%


5/3/10 Cardiac catheterization (Dr. Sharp) Conclusions-normal ventricular 

function with mild coronary artery disease.





Physical Exam


Vital Signs





Vital Signs








  Date Time  Temp Pulse Resp B/P (MAP) Pulse Ox O2 Delivery O2 Flow Rate FiO2


 


18 07:20     95   21


 


18 04:08 97.6 61 16 111/72 (85) 97   


 


18 03:38   20     


 


18 02:52     98   


 


18 00:02      Room Air  


 


18 00:02   20     


 


18 00:00  80 20 151/90 (110) 98 Room Air  





    150/88 (108)    


 


18 23:57 98.2 82 20 152/94 (113) 98 Room Air  


 


18 23:45 98.9 86 18 159/101 (120) 98   








Physical Exam


GENERAL: Alert WN, WD, NAD, pleasant, -American male


HEAD: NC, AT


EYES: Sclera clear, conjunctiva without injection


ENT: Mucous membranes pink and moist


CV: RRR, without murmur, rub, gallop, no JVD, S1-S2 no S3-S4.  Chest wall 

discomfort reproduced with palpation.


RESP: Clear lungs throughout bilateral, no crackles, wheeze, rhonchi, 

symmetrical chest rise, nonlabored, able to speak in full sentences


ABD: Soft, NT, ND, no masses, positive bowel tones


EXT: Pulses +2x4, no dependent edema, discolored and dry anteriorly bilateral 

lower extremities


MS: Normal tone x4 extremities, no obvious deformities, full range of motion


NEURO: CN II through CN XII grossly intact, motor strength 5/5


PSYCH: A+O x3, pleasant affect, appropriate speech, appropriate mood, 

questionable insight and judgment (turns to daughter for many answers to 

questions)


SKIN: Normal turgor, normal texture, brisk cap refill, decreased hair 

distribution bilateral lower extremities.


Laboratory





Laboratory Tests








Test


  18


00:05 18


03:22 18


06:28


 


White Blood Count 10.2   


 


Red Blood Count 5.06   


 


Hemoglobin 14.3   


 


Hematocrit 43.5   


 


Mean Corpuscular Volume 85.9   


 


Mean Corpuscular Hemoglobin 28.2   


 


Mean Corpuscular Hemoglobin


Concent 32.9 


  


  


 


 


Red Cell Distribution Width 13.0   


 


Platelet Count 198   


 


Mean Platelet Volume 10.6   


 


Neutrophils (%) (Auto) 58.8   


 


Lymphocytes (%) (Auto) 29.7   


 


Monocytes (%) (Auto) 9.3   


 


Eosinophils (%) (Auto) 1.3   


 


Basophils (%) (Auto) 0.9   


 


Neutrophils # (Auto) 6.0   


 


Lymphocytes # (Auto) 3.0   


 


Monocytes # (Auto) 0.9   


 


Eosinophils # (Auto) 0.1   


 


Basophils # (Auto) 0.1   


 


CBC Comment DIFF FINAL   


 


Differential Comment    


 


Prothrombin Time 10.1   


 


Prothromb Time International


Ratio 1.0 


  


  


 


 


Activated Partial


Thromboplast Time 25.0 


  


  


 


 


Blood Urea Nitrogen 27   


 


Creatinine 1.56   


 


Random Glucose 249   


 


Total Protein 9.0   


 


Albumin 4.2   


 


Calcium Level 9.3   


 


Magnesium Level 2.2   


 


Alkaline Phosphatase 145   


 


Aspartate Amino Transf


(AST/SGOT) 35 


  


  


 


 


Alanine Aminotransferase


(ALT/SGPT) 31 


  


  


 


 


Total Bilirubin 0.8   


 


Sodium Level 134   


 


Potassium Level 4.2   


 


Chloride Level 98   


 


Carbon Dioxide Level 26.3   


 


Anion Gap 10   


 


Estimat Glomerular Filtration


Rate 54 


  


  


 


 


Total Creatine Kinase 145  102  151 


 


Creatine Kinase MB 0.8  0.6  


 


Troponin I LESS THAN 0.02  LESS THAN 0.02  LESS THAN 0.02 


 


B-Type Natriuretic Peptide 3   


 


Lipase 126   








Result Diagram:  


18 0005                                                                   

             18 0005





Imaging





Last 48 hours Impressions








Chest X-Ray 18 [id number] Signed





Impressions: 





 CONCLUSION: 





 No acute cardiopulmonary process.





  





 








Course


EKG


NSR, normal axis, no st t segment change





Caprini VTE Risk Assessment


Caprini VTE Risk Assessment:  Mod/High Risk (score >= 2)


Caprini Risk Assessment Model











 Point Value = 1          Point Value = 2  Point Value = 3  Point Value = 5


 


Age 41-60


Minor surgery


BMI > 25 kg/m2


Swollen legs


Varicose veins


Pregnancy or postpartum


History of unexplained or recurrent


   spontaneous 


Oral contraceptives or hormone


   replacement


Sepsis (< 1 month)


Serious lung disease, including


   pneumonia (< 1 month)


Abnormal pulmonary function


Acute myocardial infarction


Congestive heart failure (< 1 month)


History of inflammatory bowel disease


Medical patient at bed rest Age 61-74


Arthroscopic surgery


Major open surgery (> 45 min)


Laparoscopic surgery (> 45 min)


Malignancy


Confined to bed (> 72 hours)


Immobilizing plaster cast


Central venous access Age >= 75


History of VTE


Family history of VTE


Factor V Leiden


Prothrombin 15419X


Lupus anticoagulant


Anticardiolipin antibodies


Elevated serum homocysteine


Heparin-induced thrombocytopenia


Other congenital or acquired


   thrombophilia Stroke (< 1 month)


Elective arthroplasty


Hip, pelvis, or leg fracture


Acute spinal cord injury (< 1 month)








Prophylaxis Regimen











   Total Risk


Factor Score Risk Level Prophylaxis Regimen


 


0-1      Low Early ambulation


 


2 Moderate Order ONE of the following:


*Sequential Compression Device (SCD)


*Heparin 5000 units SQ BID


 


3-4 Higher Order ONE of the following medications:


*Heparin 5000 units SQ TID


*Enoxaparin/Lovenox 40 mg SQ daily (WT < 150 kg, CrCl > 30 mL/min)


*Enoxaparin/Lovenox 30 mg SQ daily (WT < 150 kg, CrCl > 10-29 mL/min)


*Enoxaparin/Lovenox 30 mg SQ BID (WT < 150 kg, CrCl > 30 mL/min)


AND/OR


*Sequential Compression Device (SCD)


 


5 or more Highest Order ONE of the following medications:


*Heparin 5000 units SQ TID (Preferred with Epidurals)


*Enoxaparin/Lovenox 40 mg SQ daily (WT < 150 kg, CrCl > 30 mL/min)


*Enoxaparin/Lovenox 30 mg SQ daily (WT < 150 kg, CrCl > 10-29 mL/min)


*Enoxaparin/Lovenox 30 mg SQ BID (WT < 150 kg, CrCl > 30 mL/min)


AND


*Sequential Compression Device (SCD)











Assessment and Plan


Problem List:  


(1) Atypical chest pain


ICD Codes:  R07.89 - Other chest pain


Status:  Acute


Plan:  ACS ruled out with 3 sets of EKG and cardiac enzymes. Seen and evaluated 

by Dr. Mitch Pierre. Initial plan to complete exercise cardiac exam. On 

further discussion with patient and bilateral leg weakness, cardiac testing 

changed to lexiscan. Discomfort unlikely cardiac in nature due to prolonged 

discomfort and mild CAD via cardiac catheterization in . Discomfort appears 

to be muscle skeletal chest wall pain. Multiple risks factors including 

hypertension, diabetes, age, and hyperlipidemia.  Upon further review of 

medical records patient was started on statin therapy in .  Encouraged him 

to discuss with his primary care provider restarting statin therapy, if he is 

not currently taking.  Educated both patient and patient's daughter on benefit 

of statin therapy with history of type 2 diabetes. Made aware of lipid panel 

results in . Follows closely with his PCP. Suspect he is currently taking a 

statin drug and he forgets the name of drug, or either he quit taking 

medication for an unknown reason.





(2) Weakness of both legs


ICD Codes:  R29.898 - Other symptoms and signs involving the musculoskeletal 

system


Status:  Acute


Plan:  No acute findings on exam or laboratory studies. Discussed possible 

neuropathy. Unclear how long symptoms have been occurring. Discussed with RN. 

Patient ambulated roach independently, walking slowly. Encouraged upon discharge 

following up with his PCP.  





(3) Renal insufficiency


ICD Codes:  N28.9 - Disorder of kidney and ureter, unspecified


Status:  Chronic


Plan:  Follow with PCP. Patient and his daughter denies ever being told he has 

abnormal renal function. Suspect this not to be the case as medical record 

indicates mild to moderate renal insufficiency since . Unclear if taking an 

ACE inhibitor. Encouraged him to discuss with his PCP if an ACE inhibitor is 

beneficial in his case. Both he and his daughter verbalized understanding. Will 

also add to discharge instructions. 





(4) DM (diabetes mellitus)


ICD Codes:  E11.9 - Type 2 diabetes mellitus without complications


Status:  Chronic


Plan:  Hold oral anti-glycemics until after cardiac testing. SSI moderate dose 

coverage in interim. HGa1c medical record reviewed and it appears his diabetes 

is generally well controlled. Hga1c on 11-11.9%, decreased to 5.4% on . Follow up with PCP upon discharge. Random glucose on admission was 249. 








Problem Qualifiers





(1) DM (diabetes mellitus):  


Qualified Codes:  E11.8 - Type 2 diabetes mellitus with unspecified 

complications








Suzan Mejia 2018 07:40

## 2018-06-26 NOTE — EKG
Date Performed: 06/25/2018       Time Performed: 06:22:16

 

PTAGE:      68 years

 

EKG:      SINUS BRADYCARDIA INFERIOR MYOCARDIAL INFARCTION ABNORMAL ECG

 

PREVIOUS TRACING       : 06/25/2018 03.18 Since previous tracing, no significant change noted

 

DOCTOR:   Mitch Pierre  Interpretating Date/Time  06/26/2018 16:40:33

## 2018-06-26 NOTE — TR
Date Performed: 06/25/2018       Time Performed: 11:16:48

 

DOCTOR:      Mitch Pierre 

 

DRUG LIST:     

CLINICAL HISTORY:     

REASON FOR TEST:     

REASON FOR ENDING:     

OBSERVATION:     

CONCLUSION:     

COMMENTS:      Lexiscan stress test was performed under standard four minute protocol.  Radionuclide 
was injected one minute prior to ending the test. No electrocardiographic abormalities were present t
o suggest ischemia. Nuclear imaging and interpretation are pending.

## 2018-06-26 NOTE — EKG
Date Performed: 06/24/2018       Time Performed: 23:56:59

 

PTAGE:      68 years

 

EKG:      Sinus rhythm 

 

 INFERIOR MYOCARDIAL INFARCTION ABNORMAL ECG

 

PREVIOUS TRACING       : 02/21/2017 00.31 Since previous tracing, no significant change noted

 

DOCTOR:   Mitch Pierre  Interpretating Date/Time  06/26/2018 16:41:20

## 2018-06-26 NOTE — EKG
Date Performed: 06/25/2018       Time Performed: 03:18:43

 

PTAGE:      68 years

 

EKG:      Sinus rhythm 

 

 MODERATE INTRAVENTRICULAR CONDUCTION DELAY BORDERLINE ECG

 

PREVIOUS TRACING       : 06/24/2018 23.56 Since previous tracing, no significant change noted

 

DOCTOR:   Mitch Pierre  Interpretating Date/Time  06/26/2018 16:40:43

## 2023-07-11 NOTE — RADRPT
EXAM DATE/TIME:  02/21/2017 02:51 

 

[facility] COMPARISON:     

CT ABDOMEN & PELVIS W CONTRAST, February 21, 2017, 1:51.

        

 

 

INDICATIONS :     

Abdominal pain.

                     

 

MEDICAL HISTORY :     

Hypertension.     Diabetic.

 

SURGICAL HISTORY :          

Cardiac catheterization.

 

ENCOUNTER:     

Initial

 

ACUITY:     

1 day

 

PAIN SCORE:     

7/10

 

LOCATION:     

Right upper quadrant 

                     

MEASUREMENTS:     

 

LIVER:     

15.3 cm length 

 

COMMON DUCT:     

5 mm

 

RIGHT KIDNEY:     

11.4 x 5.1 x 5.7 cm

 

FINDINGS:     

 

LIVER:     

The liver demonstrates diffuse increase in echogenicity. No focal hepatic masses seen.

 

COMMON DUCT:     

No intraluminal mass or stone visualized.  

 

GALLBLADDER:          

Contains no stones, demonstrates no wall thickening or pericholecystic fluid.  

 

PANCREAS:          

The visualized portions are within normal limits.  

 

RIGHT KIDNEY:          

No evidence of hydronephrosis, stone, or mass.  

 

CONCLUSION:     Fatty liver.

 

 

 

 López Peters MD on February 21, 2017 at 3:44           

Board Certified Radiologist.

 This report was verified electronically. See HPI; IBS alternates diarrhea/constipation